# Patient Record
Sex: MALE | Race: OTHER | Employment: FULL TIME | URBAN - METROPOLITAN AREA
[De-identification: names, ages, dates, MRNs, and addresses within clinical notes are randomized per-mention and may not be internally consistent; named-entity substitution may affect disease eponyms.]

---

## 2021-09-21 ENCOUNTER — OFFICE VISIT (OUTPATIENT)
Dept: FAMILY MEDICINE CLINIC | Age: 56
End: 2021-09-21
Payer: COMMERCIAL

## 2021-09-21 VITALS
DIASTOLIC BLOOD PRESSURE: 90 MMHG | HEART RATE: 94 BPM | BODY MASS INDEX: 36.22 KG/M2 | OXYGEN SATURATION: 94 % | SYSTOLIC BLOOD PRESSURE: 130 MMHG | WEIGHT: 282.2 LBS | HEIGHT: 74 IN | RESPIRATION RATE: 18 BRPM

## 2021-09-21 DIAGNOSIS — J45.909 ASTHMA, UNSPECIFIED ASTHMA SEVERITY, UNSPECIFIED WHETHER COMPLICATED, UNSPECIFIED WHETHER PERSISTENT: ICD-10-CM

## 2021-09-21 DIAGNOSIS — I95.1 ORTHOSTATIC HYPOTENSION: ICD-10-CM

## 2021-09-21 DIAGNOSIS — R06.02 SOB (SHORTNESS OF BREATH): Primary | ICD-10-CM

## 2021-09-21 DIAGNOSIS — J93.83 OTHER PNEUMOTHORAX: ICD-10-CM

## 2021-09-21 DIAGNOSIS — R68.89 EXERCISE INTOLERANCE: ICD-10-CM

## 2021-09-21 DIAGNOSIS — R07.89 CHEST TIGHTNESS: ICD-10-CM

## 2021-09-21 DIAGNOSIS — Z99.89 CPAP (CONTINUOUS POSITIVE AIRWAY PRESSURE) DEPENDENCE: ICD-10-CM

## 2021-09-21 DIAGNOSIS — D72.829 LEUKOCYTOSIS, UNSPECIFIED TYPE: ICD-10-CM

## 2021-09-21 DIAGNOSIS — D50.9 IRON DEFICIENCY ANEMIA, UNSPECIFIED IRON DEFICIENCY ANEMIA TYPE: Primary | ICD-10-CM

## 2021-09-21 DIAGNOSIS — Z00.00 LABORATORY TESTS ORDERED AS PART OF A COMPLETE PHYSICAL EXAM (CPE): ICD-10-CM

## 2021-09-21 PROCEDURE — 93000 ELECTROCARDIOGRAM COMPLETE: CPT | Performed by: INTERNAL MEDICINE

## 2021-09-21 PROCEDURE — 99204 OFFICE O/P NEW MOD 45 MIN: CPT | Performed by: INTERNAL MEDICINE

## 2021-09-21 RX ORDER — ALBUTEROL SULFATE 90 UG/1
AEROSOL, METERED RESPIRATORY (INHALATION)
COMMUNITY
Start: 2021-08-10 | End: 2022-08-10

## 2021-09-21 RX ORDER — MIDODRINE HYDROCHLORIDE 5 MG/1
TABLET ORAL
COMMUNITY
Start: 2021-08-16 | End: 2021-09-24

## 2021-09-21 RX ORDER — CETIRIZINE HYDROCHLORIDE 10 MG/1
10 TABLET ORAL DAILY
COMMUNITY
Start: 2021-05-18 | End: 2022-01-25 | Stop reason: SDUPTHER

## 2021-09-21 RX ORDER — BUDESONIDE AND FORMOTEROL FUMARATE DIHYDRATE 80; 4.5 UG/1; UG/1
2 AEROSOL RESPIRATORY (INHALATION) 2 TIMES DAILY
COMMUNITY
Start: 2021-05-04 | End: 2022-01-25 | Stop reason: SDUPTHER

## 2021-09-21 ASSESSMENT — PATIENT HEALTH QUESTIONNAIRE - PHQ9
SUM OF ALL RESPONSES TO PHQ QUESTIONS 1-9: 0
SUM OF ALL RESPONSES TO PHQ9 QUESTIONS 1 & 2: 0
SUM OF ALL RESPONSES TO PHQ QUESTIONS 1-9: 0
SUM OF ALL RESPONSES TO PHQ QUESTIONS 1-9: 0
1. LITTLE INTEREST OR PLEASURE IN DOING THINGS: 0
2. FEELING DOWN, DEPRESSED OR HOPELESS: 0

## 2021-09-21 ASSESSMENT — ENCOUNTER SYMPTOMS
EYE PAIN: 0
WHEEZING: 1
VOMITING: 0
COLOR CHANGE: 0
CONSTIPATION: 0
NAUSEA: 0
DIARRHEA: 0
SHORTNESS OF BREATH: 1

## 2021-09-21 NOTE — PROGRESS NOTES
2021    Chief Complaint   Patient presents with    New Patient     Current PCP is retiring trying to establish new PCP. HPI    [facility]. Has asthma issues   Dx about   6-7 yrs ago with asthma  Never real bad and put on symbicort now  Not helping that much. Took pft about a yr ago  Tells me he was fine  Sat   92-94 %   A neighbor fell the other day and he almost had  His wife call for an ambulance. Used to lift wts up until few months ago  While exercising not that bad  If he lies down shortness of breath is worse  Going on 3 months  Does wheeze  Quit smoking   6 yr ago  Used to lift wts   4 x a week for 45 min  Can't do that anymore  Started all the sudden    Occasionally take clonazepam   Things are better with the wellbutrin  Was taking care of dad who had dementia   He passed in  2018    He is on midodrine for orthostatic hypotension  Too much he thinks   bp too high  Took one few days ago   Not taking regularly    Lives with wife  No children  Quit smoking      .25 pack for   30 yr  No alcohol    Brother  of leukemia  DM  Brother  Both parents with htn    Had to stop lifting wts somewhat aburpt due to shortness of breath  This summer probably in        Review of Systems   Constitutional: Positive for fatigue and unexpected weight change (gained wt). HENT: Negative for hearing loss and tinnitus. Eyes: Negative for pain and visual disturbance. Respiratory: Positive for shortness of breath and wheezing. Cardiovascular: Positive for leg swelling (around the left ankle). Negative for chest pain and palpitations. Gastrointestinal: Negative for constipation, diarrhea, nausea and vomiting. Endocrine: Positive for heat intolerance. Negative for cold intolerance. Genitourinary: Positive for frequency. Negative for dysuria. Musculoskeletal: Positive for joint swelling (right elbow). Negative for gait problem.    Skin: Negative for color change and rash.   Neurological: Positive for dizziness. Negative for headaches. Psychiatric/Behavioral: Positive for dysphoric mood. The patient is nervous/anxious. Health Maintenance   Topic Date Due    Hepatitis C screen  Never done    HIV screen  Never done    Lipid screen  Never done    Diabetes screen  Never done    Colon cancer screen colonoscopy  Never done    Shingles Vaccine (3 of 3) 2020    Flu vaccine (1) 2021    DTaP/Tdap/Td vaccine (2 - Td or Tdap) 2025    COVID-19 Vaccine  Completed    Hepatitis A vaccine  Aged Out    Hepatitis B vaccine  Aged Out    Hib vaccine  Aged Out    Meningococcal (ACWY) vaccine  Aged Out    Pneumococcal 0-64 years Vaccine  Aged Out      Social History     Tobacco Use    Smoking status: Former Smoker     Packs/day: 0.25     Years: 30.00     Pack years: 7.50     Types: Cigarettes     Start date: 3/11/1985     Quit date: 2015     Years since quittin.8    Smokeless tobacco: Never Used   Vaping Use    Vaping Use: Never used   Substance Use Topics    Alcohol use: No    Drug use: No      Family History   Problem Relation Age of Onset    Asthma Other     Heart Disease Other     High Blood Pressure Other      Prior to Visit Medications    Medication Sig Taking? Authorizing Provider   budesonide-formoterol (SYMBICORT) 80-4.5 MCG/ACT AERO Inhale 2 puffs into the lungs 2 times daily Yes Historical Provider, MD   albuterol sulfate  (90 Base) MCG/ACT inhaler INHALE 2 PUFFS BY MOUTH EVERY 6 HOURS AS NEEDED Yes Historical Provider, MD   cetirizine (ZYRTEC) 10 MG tablet Take 10 mg by mouth daily Yes Historical Provider, MD   acetaminophen (TYLENOL) 650 MG CR tablet Take 650 mg by mouth every 8 hours as needed for Pain Yes Historical Provider, MD   buPROPion (WELLBUTRIN) 75 MG tablet Take 75 mg by mouth 2 times daily Yes Historical Provider, MD   clonazePAM (KLONOPIN) 1 MG tablet Take 1 mg by mouth 3 times daily as needed.  . Yes Historical Provider, MD   omeprazole (PRILOSEC) 20 MG capsule Take 20 mg by mouth daily. Yes Historical Provider, MD   midodrine (PROAMATINE) 5 MG tablet TAKE 1 TABLET BY MOUTH 3 TIMES A DAY  Patient not taking: Reported on 9/21/2021  Historical Provider, MD   naproxen (NAPROSYN) 500 MG tablet Take 1 tablet by mouth 2 times daily for 20 doses  Marcus Lee PA-C   cyclobenzaprine (FLEXERIL) 10 MG tablet Take 1 tablet by mouth 3 times daily as needed for Muscle spasms  Patient not taking: Reported on 9/21/2021  Martín Hernandez MD   methylPREDNISolone (MEDROL, OKSANA,) 4 MG tablet Take by mouth.   Patient not taking: Reported on 9/21/2021  Martín Hernandez MD   CITALOPRAM HYDROBROMIDE PO Take 20 mg by mouth daily Pt. unsure of dose  Patient not taking: Reported on 9/21/2021  Historical Provider, MD   Omega-3 Fatty Acids (FISH OIL PO) Take by mouth daily   Patient not taking: Reported on 9/21/2021  Historical Provider, MD   dicyclomine (BENTYL) 10 MG capsule Take 1 capsule by mouth 4 times daily (before meals and nightly)  Perry Smith MD     Patient Active Problem List   Diagnosis    Diverticulitis of intestine with perforation    AVN (avascular necrosis of bone) ([facility] [address]    H/O total hip arthroplasty    Leukocytosis    Pneumoperitoneum    Diverticulitis of colon    Rotator cuff (capsule) sprain        LABS:     Lab Results   Component Value Date    LABMICR YES 05/26/2016    LABMICR YES 09/10/2015    LABMICR YES 07/17/2014       Lab Results   Component Value Date     (L) 11/24/2014     11/20/2014     (L) 11/19/2014    K 3.4 (L) 11/24/2014    K 4.9 11/20/2014    K 4.3 11/19/2014    CL 98 (L) 11/24/2014     11/20/2014    CL 98 (L) 11/19/2014    CO2 20 (L) 09/10/2015    CO2 24 11/24/2014    CO2 27 11/20/2014    BUN 8 11/24/2014    BUN 18 11/20/2014    BUN 15 11/19/2014    CREATININE 1.1 09/10/2015    CREATININE 0.8 (L) 11/24/2014    CREATININE 1.2 11/20/2014    GLUCOSE 128 (H) 11/24/2014    GLUCOSE 140 (H) 11/20/2014    GLUCOSE 96 11/19/2014    CALCIUM 8.9 11/24/2014    CALCIUM 9.0 11/20/2014    CALCIUM 9.9 11/19/2014       No results found for: CHOL, TRIG, HDL, LDLCALC, LDLDIRECT    Lab Results   Component Value Date    ALT 14 09/10/2015    ALT 17 07/25/2014    ALT 15 07/24/2014    AST 13 (L) 09/10/2015    AST 18 07/25/2014    AST 10 (L) 07/24/2014       No results found for: TSH, T4FREE    Lab Results   Component Value Date    WBC 14.7 (H) 09/10/2015    WBC 18.0 (H) 11/25/2014    WBC 17.0 (H) 11/24/2014    HGB 14.9 09/10/2015    HGB 10.5 (L) 11/25/2014    HGB 11.1 (L) 11/24/2014    HCT 45.8 09/10/2015    HCT 31.8 (L) 11/25/2014    HCT 33.5 (L) 11/24/2014    MCV 93.6 09/10/2015    MCV 90.2 11/25/2014    MCV 90.8 11/24/2014     09/10/2015     11/25/2014     11/24/2014       Lab Results   Component Value Date    INR 1.42 (H) 07/25/2014        No results found for: PSA     No results found for: LABURIC      No results found for: PSA, PSADIA     PHYSICAL EXAM:  BP (!) 130/90 (Site: Left Upper Arm, Position: Sitting, Cuff Size: Large Adult)   Pulse 94   Resp 18   Ht 6' 2\" (1.88 m)   Wt 282 lb 3.2 oz (128 kg)   SpO2 94%   BMI 36.23 kg/m²    Physical Exam  Constitutional:       Appearance: He is well-developed. He is obese. HENT:      Head: Normocephalic and atraumatic. Comments: Right ear with wax     Left Ear: Tympanic membrane and ear canal normal.   Eyes:      General: No scleral icterus. Conjunctiva/sclera: Conjunctivae normal.   Neck:      Thyroid: No thyromegaly. Vascular: No carotid bruit. Comments: No thyroid enlargement  Cardiovascular:      Rate and Rhythm: Normal rate and regular rhythm. Heart sounds: No murmur heard. Pulmonary:      Effort: Pulmonary effort is normal. No respiratory distress. Breath sounds: Normal breath sounds. No wheezing. Abdominal:      General: There is no distension.       Palpations: Abdomen is soft.      Tenderness: There is no abdominal tenderness. Musculoskeletal:         General: No deformity. Cervical back: Neck supple. Lymphadenopathy:      Cervical: No cervical adenopathy. Skin:     General: Skin is warm and dry. Findings: No rash. Neurological:      General: No focal deficit present. Mental Status: He is alert. Motor: No abnormal muscle tone. Psychiatric:         Mood and Affect: Mood normal.         Behavior: Behavior normal.         Thought Content: Thought content normal.         Judgment: Judgment normal.       BP Readings from Last 5 Encounters:   09/21/21 (!) 130/90   06/11/18 114/81   05/26/16 124/79   10/13/15 108/70   10/05/15 119/83       Wt Readings from Last 5 Encounters:   09/21/21 282 lb 3.2 oz (128 kg)   06/11/18 250 lb (113.4 kg)   05/26/16 230 lb (104.3 kg)   10/13/15 207 lb (93.9 kg)   10/02/15 215 lb (97.5 kg)    Ibeth Armas was seen today for new patient. Diagnoses and all orders for this visit:    SOB (shortness of breath)  -     EKG 12 Lead  -     XR CHEST STANDARD (2 VW); Future  -     TROPONIN; Future  -     [facility]  ekg nsr rate  92 IA and QTC are fine. No concerning t wave abn. Other pneumothorax  twice in his 19's ( was thin)    Laboratory tests ordered as part of a complete physical exam (CPE)  -     CBC Auto Differential; Future  -     Comprehensive Metabolic Panel; Future  -     Lipid Panel; Future  -     Hemoglobin A1C; Future  -     TSH with Reflex;  Future    Chest tightness  -     TROPONIN; Future  -     Delores Thornton MD, [facility]    Asthma, unspecified asthma severity, unspecified whether complicated, unspecified whether persistent  -     Delores Thornton MD, [facility]    Orthostatic hypotension  -     Delores Thornton MD, [facility], [facility]    Exercise intolerance  -     Delores Thornton MD, [facility]    CPAP (continuous positive airway pressure) dependence    Leukocytosis, unspecified type      This is a new patient to get established today. He tells me he has been more short of breath than usual.  This been going on about 3 months  He tells me he had been lifting weights for about 45 minutes 3 times a week. He just was not able to do that anymore . Wife almost called for a squad the other day when he helped a neighbor who had fallen  Need to rule out a cardiac cause. He thought it was related to asthma  No wheezing on exam  Also was taking midodrine but has stopped. bp was high in the office.   Follow up 3-4 week

## 2021-09-22 NOTE — PROGRESS NOTES
disease)      Past Surgical History:   Procedure Laterality Date    COLON SURGERY      COLOSTOMY      HIP SURGERY Bilateral L , R     Total Arthroplasty    LAPAROTOMY  14    EXPLORATORY LAPAROTOMY, BOWEL RESECTION, COLOSTOMY    LUNG SURGERY      OTHER SURGICAL HISTORY  14    OTHER SURGICAL HISTORY  10/5/15    Excision of sutures from abdominal wound    REVISION COLOSTOMY  14    Reversal     Family History   Problem Relation Age of Onset    Asthma Other     Heart Disease Other     High Blood Pressure Other      Social History     Tobacco Use    Smoking status: Former Smoker     Packs/day: 0.25     Years: 30.00     Pack years: 7.50     Types: Cigarettes     Start date: 3/11/1985     Quit date: 2015     Years since quittin.8    Smokeless tobacco: Never Used   Vaping Use    Vaping Use: Never used   Substance Use Topics    Alcohol use: No    Drug use: No       No Known Allergies  Current Outpatient Medications   Medication Sig Dispense Refill    budesonide-formoterol (SYMBICORT) 80-4.5 MCG/ACT AERO Inhale 2 puffs into the lungs 2 times daily      albuterol sulfate  (90 Base) MCG/ACT inhaler INHALE 2 PUFFS BY MOUTH EVERY 6 HOURS AS NEEDED      cetirizine (ZYRTEC) 10 MG tablet Take 10 mg by mouth daily      acetaminophen (TYLENOL) 650 MG CR tablet Take 650 mg by mouth every 8 hours as needed for Pain      buPROPion (WELLBUTRIN) 75 MG tablet Take 75 mg by mouth 2 times daily      omeprazole (PRILOSEC) 20 MG capsule Take 20 mg by mouth daily. No current facility-administered medications for this visit. Physical Exam:   /86   Pulse 91   Ht 6' 2\" (1.88 m)   Wt 283 lb 9.6 oz (128.6 kg)   SpO2 98%   BMI 36.41 kg/m²   No intake or output data in the 24 hours ending 21 0959  Wt Readings from Last 2 Encounters:   21 283 lb 9.6 oz (128.6 kg)   21 282 lb 3.2 oz (128 kg)     Constitutional: He is oriented to person, place, and time. was scribed in the presence of Jonathan Martinez MD by [employer], RN. Physician Attestation:  The scribes documentation has been prepared under my direction and personally reviewed by me in its entirety. I, Dr. Marcus Hauser personally performed the services described in this documentation as scribed by my RN in my presence, and I confirm that the note above accurately reflects all work, treatment, procedures, and medical decision making performed by me.

## 2021-09-23 DIAGNOSIS — Z86.2 HISTORY OF LEUKOCYTOSIS: ICD-10-CM

## 2021-09-23 DIAGNOSIS — D50.9 MICROCYTIC ANEMIA: Primary | ICD-10-CM

## 2021-09-23 DIAGNOSIS — Z80.6 FAMILY HISTORY OF LEUKEMIA: ICD-10-CM

## 2021-09-24 ENCOUNTER — HOSPITAL ENCOUNTER (OUTPATIENT)
Age: 56
Discharge: HOME | End: 2021-09-24
Payer: COMMERCIAL

## 2021-09-24 ENCOUNTER — OFFICE VISIT (OUTPATIENT)
Dept: CARDIOLOGY CLINIC | Age: 56
End: 2021-09-24
Payer: COMMERCIAL

## 2021-09-24 ENCOUNTER — HOSPITAL ENCOUNTER (OUTPATIENT)
Dept: GENERAL RADIOLOGY | Age: 56
Discharge: HOME | End: 2021-09-24
Payer: COMMERCIAL

## 2021-09-24 VITALS
DIASTOLIC BLOOD PRESSURE: 86 MMHG | OXYGEN SATURATION: 98 % | BODY MASS INDEX: 36.4 KG/M2 | HEART RATE: 91 BPM | SYSTOLIC BLOOD PRESSURE: 124 MMHG | WEIGHT: 283.6 LBS | HEIGHT: 74 IN

## 2021-09-24 DIAGNOSIS — I95.1 ORTHOSTATIC HYPOTENSION: ICD-10-CM

## 2021-09-24 DIAGNOSIS — G47.33 OSA (OBSTRUCTIVE SLEEP APNEA): ICD-10-CM

## 2021-09-24 DIAGNOSIS — D64.9 ANEMIA, UNSPECIFIED TYPE: ICD-10-CM

## 2021-09-24 DIAGNOSIS — R06.02 SOB (SHORTNESS OF BREATH): ICD-10-CM

## 2021-09-24 DIAGNOSIS — R06.02 SOB (SHORTNESS OF BREATH): Primary | ICD-10-CM

## 2021-09-24 PROCEDURE — 71046 X-RAY EXAM CHEST 2 VIEWS: CPT

## 2021-09-24 PROCEDURE — 99244 OFF/OP CNSLTJ NEW/EST MOD 40: CPT | Performed by: INTERNAL MEDICINE

## 2021-09-24 RX ORDER — FUROSEMIDE 20 MG/1
20 TABLET ORAL DAILY
Qty: 60 TABLET | Refills: 3 | Status: SHIPPED | OUTPATIENT
Start: 2021-09-24 | End: 2021-12-02 | Stop reason: SDUPTHER

## 2021-10-06 ENCOUNTER — OFFICE VISIT (OUTPATIENT)
Dept: FAMILY MEDICINE CLINIC | Age: 56
End: 2021-10-06
Payer: COMMERCIAL

## 2021-10-06 VITALS
BODY MASS INDEX: 36.19 KG/M2 | WEIGHT: 282 LBS | OXYGEN SATURATION: 96 % | SYSTOLIC BLOOD PRESSURE: 134 MMHG | HEIGHT: 74 IN | RESPIRATION RATE: 12 BRPM | DIASTOLIC BLOOD PRESSURE: 88 MMHG | HEART RATE: 112 BPM

## 2021-10-06 DIAGNOSIS — R73.03 PREDIABETES: ICD-10-CM

## 2021-10-06 DIAGNOSIS — Z86.2 HISTORY OF LEUKOCYTOSIS: ICD-10-CM

## 2021-10-06 DIAGNOSIS — D64.9 ANEMIA, UNSPECIFIED TYPE: Primary | ICD-10-CM

## 2021-10-06 DIAGNOSIS — Z80.6 FAMILY HISTORY OF LEUKEMIA: ICD-10-CM

## 2021-10-06 DIAGNOSIS — Z23 NEED FOR INFLUENZA VACCINATION: ICD-10-CM

## 2021-10-06 PROCEDURE — 99214 OFFICE O/P EST MOD 30 MIN: CPT | Performed by: INTERNAL MEDICINE

## 2021-10-06 PROCEDURE — 90688 IIV4 VACCINE SPLT 0.5 ML IM: CPT | Performed by: INTERNAL MEDICINE

## 2021-10-06 PROCEDURE — 90471 IMMUNIZATION ADMIN: CPT | Performed by: INTERNAL MEDICINE

## 2021-10-06 RX ORDER — FERROUS SULFATE 325(65) MG
325 TABLET ORAL
Status: ON HOLD | COMMUNITY
End: 2022-04-14

## 2021-10-06 ASSESSMENT — ENCOUNTER SYMPTOMS: SHORTNESS OF BREATH: 1

## 2021-10-06 NOTE — PROGRESS NOTES
Chari Jason (:  1965) is a 49 y.o. male,Established patient, here for evaluation of the following chief complaint(s):  Abnormal Lab (f/u)    Royal Marshall was seen today for abnormal lab. Diagnoses and all orders for this visit:    Anemia, unspecified type    Need for influenza vaccination  -     INFLUENZA, QUADV, 3 YRS AND OLDER, IM, MDV, 0.5ML (AFLURIA QUADV)    History of leukocytosis    Family history of leukemia brother    Prediabetes    work on diet  And exercise ( when ok with cardiology)  Take iron bid instead of once a day with vit c.  bp  Up but stress from sister's death   repeat     SUBJECTIVE/OBJECTIVE:  HPI  Pt saw cardiology Dr Nadiya De Santiago  for his shortness of breath  He stopped the midodrine on his own  Started  Him on lasix  Ordered an echo  Does not see blood in the stool or black tarry stool. Here to go over labs. Prediabetic  Eats a lot of carbs  Bread     Sister passed yesterday  Was  60 yo  Unknown why she   Appetite is down    Brother  of leukemia  Brother had heart dx  46      Review of Systems   Constitutional: Negative for appetite change and unexpected weight change. Respiratory: Positive for shortness of breath (with exertion). Genitourinary:        No black stool other than from iron  No bloody stools          Objective   Physical Exam  Constitutional:       Appearance: Normal appearance. He is obese. HENT:      Head: Normocephalic and atraumatic. Pulmonary:      Effort: Pulmonary effort is normal.   Neurological:      Mental Status: He is alert. Psychiatric:         Mood and Affect: Mood normal.         Behavior: Behavior normal.         Thought Content: Thought content normal.         Judgment: Judgment normal.            An electronic signature was used to authenticate this note.     --Varsha Berman MD

## 2021-10-12 DIAGNOSIS — E61.1 IRON DEFICIENCY: Primary | ICD-10-CM

## 2021-10-21 ENCOUNTER — PATIENT MESSAGE (OUTPATIENT)
Dept: FAMILY MEDICINE CLINIC | Age: 56
End: 2021-10-21

## 2021-10-21 NOTE — TELEPHONE ENCOUNTER
From: Sinan Joiner  To: Deniz Hutchinson MD  Sent: 10/21/2021 12:56 PM EDT  Subject: Test Results Question    Got a JAK2 MUTATION result of 0.0. I have no idea what that means. Can someone explain it to me please?   Thank you

## 2021-12-02 NOTE — TELEPHONE ENCOUNTER
Medication Refill    Medication needing refilled:  furosemide (LASIX)      Dosage of the medication:  20 MG tablet       How are you taking this medication (QD, BID, TID, QID, PRN):  Take 1 tablet by mouth daily    30 or 90 day supply called in:90      Which Pharmacy are we sending the medication to?:    [address]  [place], [address]  [facility]: [phone]

## 2021-12-03 RX ORDER — FUROSEMIDE 20 MG/1
20 TABLET ORAL DAILY
Qty: 90 TABLET | Refills: 3 | Status: SHIPPED | OUTPATIENT
Start: 2021-12-03 | End: 2022-05-04 | Stop reason: SDUPTHER

## 2021-12-23 ENCOUNTER — TELEPHONE (OUTPATIENT)
Dept: FAMILY MEDICINE CLINIC | Age: 56
End: 2021-12-23

## 2021-12-23 ENCOUNTER — PATIENT MESSAGE (OUTPATIENT)
Dept: FAMILY MEDICINE CLINIC | Age: 56
End: 2021-12-23

## 2021-12-23 DIAGNOSIS — R68.89 FLU-LIKE SYMPTOMS: Primary | ICD-10-CM

## 2021-12-23 NOTE — TELEPHONE ENCOUNTER
From: [facility]  To: Dr. Claudy Hunter: 12/23/2021 10:38 AM EST  Subject: Non-Urgent Medical Question    Hello,  I am having flu like symptoms. Sneezing, cough, achy.  I was wondering if y'all could tell me where to go for a COVID test.  Thank you

## 2022-01-14 ENCOUNTER — HOSPITAL ENCOUNTER (OUTPATIENT)
Dept: NON INVASIVE DIAGNOSTICS | Age: 57
Discharge: HOME | End: 2022-01-14
Payer: COMMERCIAL

## 2022-01-14 DIAGNOSIS — R06.02 SOB (SHORTNESS OF BREATH): ICD-10-CM

## 2022-01-14 LAB
LV EF: 58 %
LVEF MODALITY: NORMAL

## 2022-01-14 PROCEDURE — 93306 TTE W/DOPPLER COMPLETE: CPT

## 2022-01-21 RX ORDER — IBUPROFEN 200 MG
800 TABLET ORAL EVERY 6 HOURS PRN
COMMUNITY

## 2022-01-21 RX ORDER — BUPROPION HYDROCHLORIDE 200 MG/1
200 TABLET, EXTENDED RELEASE ORAL 2 TIMES DAILY
Status: ON HOLD | COMMUNITY
End: 2022-04-14

## 2022-01-21 RX ORDER — ASCORBIC ACID 1000 MG
TABLET ORAL
COMMUNITY
End: 2022-01-21

## 2022-01-21 RX ORDER — ASPIRIN 81 MG/1
81 TABLET ORAL DAILY
COMMUNITY

## 2022-01-21 RX ORDER — BUPROPION HYDROCHLORIDE 200 MG/1
TABLET, EXTENDED RELEASE ORAL
COMMUNITY
Start: 2021-10-27 | End: 2022-01-21

## 2022-01-21 NOTE — FLOWSHEET NOTE
Preoperative Screening for Elective Surgery/Invasive Procedures While COVID-19 present in the community     Have you tested positive or have been told to self-isolate for COVID-19 like symptoms within the past 28 days?  Do you currently have any of the following symptoms? o Fever >100.0 F or 99.9 F in immunocompromised patients? o New onset cough, shortness of breath or difficulty breathing?  o New onset sore throat, myalgia (muscle aches and pains), headache, loss of taste/smell or diarrhea?  Have you had a potential exposure to COVID-19 within the past 14 days by:  o Close contact with a confirmed case? o Close contact with a healthcare worker,  or essential infrastructure worker (grocery store, [employer], gas station, public utilities or transportation)? o Do you reside in a congregate setting such as; skilled nursing facility, adult home, correctional facility, homeless shelter or other institutional setting?  o Have you had recent travel to a known COVID-19 hotspot? Indicate if the patient has a positive screen by answering yes to one or more of the above questions. Patients who test positive or screen positive prior to surgery or on the day of surgery should be evaluated in conjunction with the surgeon/proceduralist/anesthesiologist to determine the urgency of the procedure. no to all above.  Vaccinated, proof in [facility]
of surgery. All jewelry must be removed. If you have dentures, they will be removed before going to operating room. For your convenience, we will provide you with a container. If you wear contact lenses or glasses, they will be removed, please bring a case for them. If you have a living will and a durable power of  for healthcare, please bring in a copy. As part of our patient safety program to minimize surgical site infections, we ask you to do the following:    · Please notify your surgeon if you develop any illness between         now and the  day of your surgery. · This includes a cough, cold, fever, sore throat, nausea,         or vomiting, and diarrhea, etc.  ·  Please notify your surgeon if you experience dizziness, shortness         of breath or blurred vision between now and the time of your surgery. Do not shave your operative site 96 hours prior to surgery. For face and neck surgery, men may use an electric razor 48 hours   prior to surgery. You may shower the night before surgery or the morning of   your surgery with an antibacterial soap. You will need to bring a photo ID and insurance card    [facility] has an onsite pharmacy, would you like to utilize our pharmacy     If you will be staying overnight and use a C-pap machine, please bring   your C-pap to hospital     Our goal is to provide you with excellent care, therefore, visitors will be limited to two(2) in the room at a time so that we may focus on providing this care for you. Please contact pre-admission testing if you have any further questions. [facility] phone number:  [phone]  Please note these are generalized instructions for all surgical cases, you may be provided with more specific instructions according to your surgery.

## 2022-01-27 ENCOUNTER — ANESTHESIA EVENT (OUTPATIENT)
Dept: ENDOSCOPY | Age: 57
End: 2022-01-27
Payer: COMMERCIAL

## 2022-01-28 ENCOUNTER — HOSPITAL ENCOUNTER (OUTPATIENT)
Age: 57
Setting detail: OUTPATIENT SURGERY
Discharge: HOME | End: 2022-01-28
Attending: INTERNAL MEDICINE | Admitting: INTERNAL MEDICINE
Payer: COMMERCIAL

## 2022-01-28 ENCOUNTER — ANESTHESIA (OUTPATIENT)
Dept: ENDOSCOPY | Age: 57
End: 2022-01-28
Payer: COMMERCIAL

## 2022-01-28 VITALS
BODY MASS INDEX: 36.19 KG/M2 | HEART RATE: 96 BPM | HEIGHT: 74 IN | OXYGEN SATURATION: 93 % | SYSTOLIC BLOOD PRESSURE: 133 MMHG | WEIGHT: 282 LBS | RESPIRATION RATE: 17 BRPM | TEMPERATURE: 97.2 F | DIASTOLIC BLOOD PRESSURE: 99 MMHG

## 2022-01-28 VITALS — SYSTOLIC BLOOD PRESSURE: 114 MMHG | OXYGEN SATURATION: 93 % | DIASTOLIC BLOOD PRESSURE: 74 MMHG

## 2022-01-28 DIAGNOSIS — D50.0 CHRONIC BLOOD LOSS ANEMIA: ICD-10-CM

## 2022-01-28 PROCEDURE — 3700000001 HC ADD 15 MINUTES (ANESTHESIA): Performed by: INTERNAL MEDICINE

## 2022-01-28 PROCEDURE — 7100000010 HC PHASE II RECOVERY - FIRST 15 MIN: Performed by: INTERNAL MEDICINE

## 2022-01-28 PROCEDURE — 3609010600 HC COLONOSCOPY POLYPECTOMY SNARE/COLD BIOPSY: Performed by: INTERNAL MEDICINE

## 2022-01-28 PROCEDURE — 88305 TISSUE EXAM BY PATHOLOGIST: CPT

## 2022-01-28 PROCEDURE — 2580000003 HC RX 258: Performed by: ANESTHESIOLOGY

## 2022-01-28 PROCEDURE — 7100000011 HC PHASE II RECOVERY - ADDTL 15 MIN: Performed by: INTERNAL MEDICINE

## 2022-01-28 PROCEDURE — 3700000000 HC ANESTHESIA ATTENDED CARE: Performed by: INTERNAL MEDICINE

## 2022-01-28 PROCEDURE — 6360000002 HC RX W HCPCS: Performed by: NURSE ANESTHETIST, CERTIFIED REGISTERED

## 2022-01-28 PROCEDURE — 3609012400 HC EGD TRANSORAL BIOPSY SINGLE/MULTIPLE: Performed by: INTERNAL MEDICINE

## 2022-01-28 PROCEDURE — 2709999900 HC NON-CHARGEABLE SUPPLY: Performed by: INTERNAL MEDICINE

## 2022-01-28 PROCEDURE — 88342 IMHCHEM/IMCYTCHM 1ST ANTB: CPT

## 2022-01-28 PROCEDURE — 2500000003 HC RX 250 WO HCPCS: Performed by: NURSE ANESTHETIST, CERTIFIED REGISTERED

## 2022-01-28 RX ORDER — PROPOFOL 10 MG/ML
INJECTION, EMULSION INTRAVENOUS PRN
Status: DISCONTINUED | OUTPATIENT
Start: 2022-01-28 | End: 2022-01-28 | Stop reason: SDUPTHER

## 2022-01-28 RX ORDER — SODIUM CHLORIDE 0.9 % (FLUSH) 0.9 %
5-40 SYRINGE (ML) INJECTION PRN
Status: DISCONTINUED | OUTPATIENT
Start: 2022-01-28 | End: 2022-01-28 | Stop reason: HOSPADM

## 2022-01-28 RX ORDER — SODIUM CHLORIDE 0.9 % (FLUSH) 0.9 %
5-40 SYRINGE (ML) INJECTION EVERY 12 HOURS SCHEDULED
Status: DISCONTINUED | OUTPATIENT
Start: 2022-01-28 | End: 2022-01-28 | Stop reason: HOSPADM

## 2022-01-28 RX ORDER — LABETALOL HYDROCHLORIDE 5 MG/ML
5 INJECTION, SOLUTION INTRAVENOUS EVERY 10 MIN PRN
Status: DISCONTINUED | OUTPATIENT
Start: 2022-01-28 | End: 2022-01-28 | Stop reason: HOSPADM

## 2022-01-28 RX ORDER — ONDANSETRON 2 MG/ML
4 INJECTION INTRAMUSCULAR; INTRAVENOUS
Status: DISCONTINUED | OUTPATIENT
Start: 2022-01-28 | End: 2022-01-28 | Stop reason: HOSPADM

## 2022-01-28 RX ORDER — SODIUM CHLORIDE 9 MG/ML
INJECTION, SOLUTION INTRAVENOUS CONTINUOUS
Status: DISCONTINUED | OUTPATIENT
Start: 2022-01-28 | End: 2022-01-28 | Stop reason: HOSPADM

## 2022-01-28 RX ORDER — SODIUM CHLORIDE 9 MG/ML
25 INJECTION, SOLUTION INTRAVENOUS PRN
Status: DISCONTINUED | OUTPATIENT
Start: 2022-01-28 | End: 2022-01-28 | Stop reason: HOSPADM

## 2022-01-28 RX ORDER — LIDOCAINE HYDROCHLORIDE 20 MG/ML
INJECTION, SOLUTION EPIDURAL; INFILTRATION; INTRACAUDAL; PERINEURAL PRN
Status: DISCONTINUED | OUTPATIENT
Start: 2022-01-28 | End: 2022-01-28 | Stop reason: SDUPTHER

## 2022-01-28 RX ORDER — PROMETHAZINE HYDROCHLORIDE 25 MG/ML
6.25 INJECTION, SOLUTION INTRAMUSCULAR; INTRAVENOUS
Status: DISCONTINUED | OUTPATIENT
Start: 2022-01-28 | End: 2022-01-28 | Stop reason: HOSPADM

## 2022-01-28 RX ADMIN — SODIUM CHLORIDE: 9 INJECTION, SOLUTION INTRAVENOUS at 08:21

## 2022-01-28 RX ADMIN — SODIUM CHLORIDE: 9 INJECTION, SOLUTION INTRAVENOUS at 08:56

## 2022-01-28 RX ADMIN — PROPOFOL 50 MG: 10 INJECTION, EMULSION INTRAVENOUS at 09:02

## 2022-01-28 RX ADMIN — PROPOFOL 50 MG: 10 INJECTION, EMULSION INTRAVENOUS at 08:59

## 2022-01-28 RX ADMIN — LIDOCAINE HYDROCHLORIDE 50 MG: 20 INJECTION, SOLUTION EPIDURAL; INFILTRATION; INTRACAUDAL; PERINEURAL at 08:56

## 2022-01-28 RX ADMIN — PROPOFOL 50 MG: 10 INJECTION, EMULSION INTRAVENOUS at 09:08

## 2022-01-28 RX ADMIN — PROPOFOL 150 MG: 10 INJECTION, EMULSION INTRAVENOUS at 08:56

## 2022-01-28 RX ADMIN — PROPOFOL 50 MG: 10 INJECTION, EMULSION INTRAVENOUS at 08:58

## 2022-01-28 RX ADMIN — PROPOFOL 50 MG: 10 INJECTION, EMULSION INTRAVENOUS at 09:16

## 2022-01-28 ASSESSMENT — PAIN SCALES - GENERAL
PAINLEVEL_OUTOF10: 0
PAINLEVEL_OUTOF10: 0

## 2022-01-28 ASSESSMENT — PAIN - FUNCTIONAL ASSESSMENT
PAIN_FUNCTIONAL_ASSESSMENT: 0-10
PAIN_FUNCTIONAL_ASSESSMENT: 0-10

## 2022-01-28 NOTE — H&P
[facility]   Pre-operative History and Physical    Patient: Sonny Agudelo  : 1965  Acct#: [de-identified]    History Obtained From: electronic medical record    HISTORY OF PRESENT ILLNESS  Procedure:EGD and colonoscopy  Indications:iron deficient anemia  Past Medical History:        Diagnosis Date    Anxiety     Anxiety 2005    Asthma 2019    exercise-induced asthma    Bursitis 2016    both shoulders    Depression     Depression 2019    Diverticula of colon     Diverticulitis 2019    of colon    GERD (gastroesophageal reflux disease)     Orthostatic hypotension 2021    Reflux esophagitis 2005    Sleep apnea     cpap     Past Surgical History:        Procedure Laterality Date    COLON SURGERY      COLONOSCOPY      COLOSTOMY      COLOSTOMY      closed    HIP SURGERY Bilateral L 2006, R 2007    Total Arthroplasty    JOINT REPLACEMENT  2019    hx bilateral hip replacements    LAPAROTOMY  14    EXPLORATORY LAPAROTOMY, BOWEL RESECTION, COLOSTOMY    LUNG SURGERY      OTHER SURGICAL HISTORY  14    OTHER SURGICAL HISTORY  10/5/15    Excision of sutures from abdominal wound    REVISION COLOSTOMY  14    Reversal    ROTATOR CUFF REPAIR  2019    THORACENTESIS      insert chest tube-age 25     Medications prior to admission:   Prior to Admission medications    Medication Sig Start Date End Date Taking?  Authorizing Provider   budesonide-formoterol (SYMBICORT) 80-4.5 MCG/ACT AERO Inhale 2 puffs into the lungs 2 times daily 22  Yes Jaqui Valdez MD   cetirizine (ZYRTEC) 10 MG tablet Take 1 tablet by mouth daily as needed for Allergies 22  Yes Jaqui Valdez MD   [facility] SR) 200 MG extended release tablet Take 200 mg by mouth 2 times daily   Yes Historical Provider, MD   aspirin 81 MG EC tablet Take 81 mg by mouth daily   Yes Historical Provider, Amada Fuller, (BL SAW PALMETTO PO) Take by mouth   Yes Historical Provider, MD   furosemide (LASIX) 20 MG tablet Take 1 tablet by mouth daily 12/3/21  Yes Leon Rees MD   ferrous sulfate (IRON 325) 325 (65 Fe) MG tablet Take 325 mg by mouth daily (with breakfast)   Yes Historical Provider, MD   omeprazole (PRILOSEC) 20 MG capsule Take 20 mg by mouth daily. Yes Historical Provider, MD   ibuprofen (ADVIL;MOTRIN) 200 MG tablet Take 800 mg by mouth every 6 hours as needed     Historical Provider, MD   albuterol sulfate  (90 Base) MCG/ACT inhaler INHALE 2 PUFFS BY MOUTH EVERY 6 HOURS AS NEEDED 8/10/21   Historical Provider, MD   acetaminophen (TYLENOL) 650 MG CR tablet Take 650 mg by mouth every 8 hours as needed for Pain    Historical Provider, MD     Allergies:   Patient has no known allergies. Social History     Socioeconomic History    Marital status:      Spouse name: Not on file    Number of children: Not on file    Years of education: Not on file    Highest education level: Not on file   Occupational History    Occupation: NA   Tobacco Use    Smoking status: Former Smoker     Packs/day: 0.25     Years: 30.00     Pack years: 7.50     Types: Cigarettes     Start date: 3/11/1985     Quit date: 2015     Years since quittin.1    Smokeless tobacco: Never Used   Vaping Use    Vaping Use: Never used   Substance and Sexual Activity    Alcohol use: No    Drug use: No    Sexual activity: Yes     Partners: Female   Other Topics Concern    Not on file   Social History Narrative    Not on file     Social Determinants of Health     Financial Resource Strain:     Difficulty of Paying Living Expenses: Not on file   Food Insecurity:     Worried About [address] in the Last Year: Not on file    Miya of Food in the Last Year: Not on file   Transportation Needs:     Lack of Transportation (Medical): Not on file    Lack of Transportation (Non-Medical):  Not on file   Physical Activity:     Days of Exercise per Week: Not on file  Minutes of Exercise per Session: Not on file   Stress:     Feeling of Stress : Not on file   Social Connections:     Frequency of Communication with Friends and Family: Not on file    Frequency of Social Gatherings with Friends and Family: Not on file    Attends Mandaen Services: Not on file    Active Member of [address] [place] [address] or Organizations: Not on file    Attends Club or Organization Meetings: Not on file    Marital Status: Not on file   Intimate Partner Violence:     Fear of Current or Ex-Partner: Not on file    Emotionally Abused: Not on file    Physically Abused: Not on file    Sexually Abused: Not on file   Housing Stability:     Unable to Pay for Housing in the Last Year: Not on file    Number of [place] in the Last Year: Not on file    Unstable Housing in the Last Year: Not on file     Family History   Problem Relation Age of Onset    Other Father         CHF    Cancer Father         prostate    Asthma Other     Heart Disease Other     High Blood Pressure Other     Cancer Brother         leukemia    Heart Attack Brother          PHYSICAL EXAM:      BP (!) 157/102   Pulse 94   Temp 97.5 °F (36.4 °C) (Temporal)   Resp 17   Ht 6' 2\" (1.88 m)   Wt 282 lb (127.9 kg)   SpO2 96%   BMI 36.21 kg/m²  I        Heart:normal    Lungs: normal    Abdomen: normal      ASA Grade:  See anesthesia note      ASSESSMENT AND PLAN:    1. Procedure options, risks and benefits reviewed with patient and expresses understanding.

## 2022-01-28 NOTE — ANESTHESIA POSTPROCEDURE EVALUATION
[facility] Department of Anesthesiology  Post-Anesthesia Note       Name:  Rylie Pitts                                  Age:  49 y.o. MRN:  [id number]     Last Vitals & Oxygen Saturation: BP (!) 133/99   Pulse 96   Temp 97.2 °F (36.2 °C) (Temporal)   Resp 17   Ht 6' 2\" (1.88 m)   Wt 282 lb (127.9 kg)   SpO2 93%   BMI 36.21 kg/m²   Patient Vitals for the past 4 hrs:   BP Temp Temp src Pulse Resp SpO2 Height Weight   01/28/22 0934 (!) 133/99 97.2 °F (36.2 °C) Temporal 96 17 93 %     01/28/22 0928 123/80 97.3 °F (36.3 °C) Temporal 98 16 96 %     01/28/22 0817 (!) 157/102 97.5 °F (36.4 °C) Temporal 94 17 96 % 6' 2\" (1.88 m) 282 lb (127.9 kg)   01/28/22 0813       6' 2\" (1.88 m) 282 lb (127.9 kg)       Level of consciousness:  Awake, alert    Respiratory: Respirations easy, no distress. Stable. Cardiovascular: Hemodynamically stable. Hydration: Adequate. PONV: Adequately managed. Post-op pain: Adequately controlled. Post-op assessment: Tolerated anesthetic well without complication. Complications:  None.     Vicki Daniel MD  January 28, 2022   10:10 AM

## 2022-01-28 NOTE — PROCEDURES
[facility]  Endoscopy Note    Patient: Pacheco Ahumada  : 1965  Acct#: [de-identified]    Procedure: Esophagogastroduodenoscopy with biopsy    Date:  2022     Surgeon:  Yamileth Scott MD, MD    Referring Physician:  Janiya Kunz    Preoperative Diagnosis:  anemia    Postoperative Diagnosis:  Ulcerative esophagitis and gastritis    Anesthesia: see anesthesia note. Indications: This is a 49y.o. year old male who presents today with Unexplained iron deficiency anaemia. Description of Procedure:  Informed consent was obtained from the patient after explanation of indications, benefits and possible risks and complications of the procedure. The patient was then taken to the endoscopy suite, placed in the left lateral decubitus position and the above IV sedation was administrered. The Olympus videoendoscope was placed in the patient's mouth and under direct visualization passed into the esophagus. Visualization of the esophagus demonstrated ulcerative esophagitis. .     The scope was then advanced into the stomach. Visualization of the gastric body and antrum demonstrated gastritis. The antrum was biopsied. .  A retroflexed exam of the gastric cardia and fundus demonstrated normal..  The pylorus was patent and the scope was advanced into the duodenum. Visualization of the duodenal bulb demonstrated normal..  The second portion of the duodenum demonstrated normal..    The scope was then withdrawn back into the stomach, it was decompressed, and the scope was completely withdrawn. The patient tolerated the procedure well and was taken to the post anesthesia care unit in good condition. Estimated Blood loss:  none    Impression: Ulcerative esophagitis and gastritis. Recommendations:Await pathology. PPI.     Yamileth Scott MD, MD  [facility]

## 2022-01-28 NOTE — ANESTHESIA PRE PROCEDURE
[facility] Department of Anesthesiology  Pre-Anesthesia Evaluation/Consultation       Name:  Morteza Macario  : 1965  Age:  49 y. o.                                            MRN:  [id number]  Date: 2022           Surgeon: Surgeon(s):  Ingrid Pham MD    Procedure: Procedure(s):  COLONOSCOPY DIAGNOSTIC  EGD ESOPHAGOGASTRODUODENOSCOPY     No Known Allergies  Patient Active Problem List   Diagnosis    Diverticulitis of intestine with perforation    AVN (avascular necrosis of bone) ([address]    H/O total hip arthroplasty    Leukocytosis    Pneumoperitoneum from diverticulitis    Diverticulitis of colon    Rotator cuff (capsule) sprain    Other pneumothorax  twice in his 20's ( was thin)    CPAP (continuous positive airway pressure) dependence    Exercise intolerance    Orthostatic hypotension    Asthma    Chest tightness    SOB (shortness of breath)    Family history of leukemia brother    History of leukocytosis     Past Medical History:   Diagnosis Date    Anxiety     Anxiety 2005    Asthma 2019    exercise-induced asthma    Bursitis 2016    both shoulders    Depression     Depression 2019    Diverticula of colon     Diverticulitis 2019    of colon    GERD (gastroesophageal reflux disease)     Orthostatic hypotension 2021    Reflux esophagitis 2005    Sleep apnea     cpap     Past Surgical History:   Procedure Laterality Date    COLON SURGERY      COLONOSCOPY      COLOSTOMY      COLOSTOMY  2014    closed    HIP SURGERY Bilateral L 2006, R 2007    Total Arthroplasty    JOINT REPLACEMENT  2019    hx bilateral hip replacements    LAPAROTOMY  14    EXPLORATORY LAPAROTOMY, BOWEL RESECTION, COLOSTOMY    LUNG SURGERY      OTHER SURGICAL HISTORY  14    OTHER SURGICAL HISTORY  10/5/15    Excision of sutures from abdominal wound    REVISION COLOSTOMY  14    Reversal    ROTATOR CUFF REPAIR  2019    THORACENTESIS      insert chest tube-age 25 Social History     Tobacco Use    Smoking status: Former Smoker     Packs/day: 0.25     Years: 30.00     Pack years: 7.50     Types: Cigarettes     Start date: 3/11/1985     Quit date: 2015     Years since quittin.1    Smokeless tobacco: Never Used   Vaping Use    Vaping Use: Never used   Substance Use Topics    Alcohol use: No    Drug use: No     Medications  No current facility-administered medications on file prior to encounter. Current Outpatient Medications on File Prior to Encounter   Medication Sig Dispense Refill    buPROPion (WELLBUTRIN SR) 200 MG extended release tablet Take 200 mg by mouth 2 times daily      aspirin 81 MG EC tablet Take 81 mg by mouth daily      Saw Palmetto, Serenoa repens, (BL SAW PALMETTO PO) Take by mouth      furosemide (LASIX) 20 MG tablet Take 1 tablet by mouth daily 90 tablet 3    ferrous sulfate (IRON 325) 325 (65 Fe) MG tablet Take 325 mg by mouth daily (with breakfast)      omeprazole (PRILOSEC) 20 MG capsule Take 20 mg by mouth daily.       ibuprofen (ADVIL;MOTRIN) 200 MG tablet Take 800 mg by mouth every 6 hours as needed       albuterol sulfate  (90 Base) MCG/ACT inhaler INHALE 2 PUFFS BY MOUTH EVERY 6 HOURS AS NEEDED      acetaminophen (TYLENOL) 650 MG CR tablet Take 650 mg by mouth every 8 hours as needed for Pain       Current Facility-Administered Medications   Medication Dose Route Frequency Provider Last Rate Last Admin    0.9 % sodium chloride infusion   IntraVENous Continuous Arya Ferro MD 75 mL/hr at 22 0821 New Bag at 22 0821    sodium chloride flush 0.9 % injection 5-40 mL  5-40 mL IntraVENous 2 times per day Arya Ferro MD        sodium chloride flush 0.9 % injection 5-40 mL  5-40 mL IntraVENous PRN Arya Ferro MD        0.9 % sodium chloride infusion  25 mL IntraVENous PRN Arya Ferro MD         Vital Signs (Current)   Vitals:    22 0817   BP: (!) 157/102   Pulse: 94 Resp: 17   Temp: 97.5 °F (36.4 °C)   SpO2: 96%     Vital Signs Statistics (for past 48 hrs)     Temp  Av.5 °F (36.4 °C)  Min: 97.5 °F (36.4 °C)   Min taken time: 22  Max: 97.5 °F (36.4 °C)   Max taken time: 22  Pulse  Av  Min: 94   Min taken time: 22  Max: 94   Max taken time: 22  Resp  Av  Min: 16   Min taken time: 22  Max: 17   Max taken time: 22  BP  Min: 157/102   Min taken time: 22  Max: 157/102   Max taken time: 22  SpO2  Av %  Min: 96 %   Min taken time: 22  Max: 96 %   Max taken time: 22    BP Readings from Last 3 Encounters:   22 (!) 157/102   10/06/21 134/88   21 124/86     BMI  Body mass index is 36.21 kg/m². Estimated body mass index is 36.21 kg/m² as calculated from the following:    Height as of this encounter: 6' 2\" (1.88 m). Weight as of this encounter: 282 lb (127.9 kg). CBC   Lab Results   Component Value Date    WBC 10.8 2021    RBC 4.87 2021    HGB 11.8 2021    HCT 37.0 2021    MCV 76.0 2021    RDW 19.1 2021     2021     CMP    Lab Results   Component Value Date     2021    K 4.8 2021     2021    CO2 24 2021    BUN 13 2021    CREATININE 1.1 2021    GFRAA >60 2021    AGRATIO 1.6 2021    LABGLOM >60 2021    GLUCOSE 72 2021    PROT 7.4 2021    CALCIUM 9.6 2021    BILITOT 0.3 2021    ALKPHOS 123 2021    AST 20 2021    ALT 23 2021     BMP    Lab Results   Component Value Date     2021    K 4.8 2021     2021    CO2 24 2021    BUN 13 2021    CREATININE 1.1 2021    CALCIUM 9.6 2021    GFRAA >60 2021    LABGLOM >60 2021    GLUCOSE 72 2021     POCGlucose  No results for input(s): GLUCOSE in the last 72 hours.    Coags    Lab Results Component Value Date    PROTIME 15.7 07/25/2014    INR 1.42 93/44/6700     HCG (If Applicable) No results found for: PREGTESTUR, PREGSERUM, HCG, HCGQUANT   ABGs No results found for: PHART, PO2ART, MQP5LNT, GCD8YLY, BEART, O8WJJODY   Type & Screen (If Applicable)  No results found for: LABABO, LABRH                         BMI: Wt Readings from Last 3 Encounters:       NPO Status:   Date of last liquid consumption: 01/28/22   Time of last liquid consumption: 0500 (dr Alejo Bang aware)   Date of last solid food consumption: 01/27/22      Time of last solid consumption: 1900       Anesthesia Evaluation  Patient summary reviewed history of anesthetic complications:   Airway: Mallampati: III  TM distance: >3 FB   Neck ROM: full   Dental: normal exam         Pulmonary:normal exam    (+) sleep apnea:  asthma:                            Cardiovascular:  Exercise tolerance: good (>4 METS),         ECG reviewed  Rhythm: regular  Rate: normal  Echocardiogram reviewed         Beta Blocker:  Not on Beta Blocker      ROS comment: EF 55     Neuro/Psych:   (+) psychiatric history:depression/anxiety             GI/Hepatic/Renal:   (+) GERD: well controlled, bowel prep,           Endo/Other: Negative Endo/Other ROS                    Abdominal:   (+) obese,           Vascular: negative vascular ROS. Other Findings:             Anesthesia Plan      MAC     ASA 3       Induction: intravenous. Anesthetic plan and risks discussed with patient. Plan discussed with CRNA. This pre-anesthesia assessment may be used as a history and physical.    DOS STAFF ADDENDUM:    Pt seen and examined, chart reviewed (including anesthesia, drug and allergy history). No interval changes to history and physical examination. Anesthetic plan, risks, benefits, alternatives, and personnel involved discussed with patient. Questions and concerns addressed. Patient(family) verbalized an understanding and agrees to proceed. Con Pirde MD  January 28, 2022  8:41 AM

## 2022-01-28 NOTE — ADDENDUM NOTE
Addendum  created 01/28/22 1508 by GUIDO Tomlin CRNA    Flowsheet accepted, Intraprocedure Flowsheets edited, Intraprocedure Meds edited

## 2022-01-28 NOTE — PROCEDURES
[facility]  Endoscopy Note    Patient: Kaylan Vergara  : 1965  Acct#: [de-identified]    Procedure: Colonoscopy with polypectomy (snare cautery)    Date:  2022    Surgeon:  Jairo Contreras MD, MD    Referring Physician:  Latha Roberts    Previous Colonoscopy: Yes  Date: unknown  Greater than 3 years? Yes    Preoperative Diagnosis:  anemia    Postoperative Diagnosis:  Colon polyp    Anesthesia:  See anesthesia note    Indications: This is a 49y.o. year old male who presents today with iron deficiency anemia. Procedure: An informed consent was obtained from the patient after explanation of indications, benefits, possible risks and complications of the procedure. The patient was then taken to the endoscopy suite, placed in the left lateral decubitus position, and the above IV anesthesia was administered. A digital rectal examination was performed and revealed negative without mass, lesions or tenderness. The Olympus CFQ-180-AL video colonoscope was placed in the patient's rectum under digital direction and advanced to the cecum. The cecum was identified by characteristic anatomy and ballottment. The ileocecal valve was identified. The preparation was poor. The scope was then withdrawn back through the cecum, ascending, transverse, descending and sigmoid colons. Carefull circumferential examination of the mucosa in these areas demonstrated a 6 mm polyp in the ascending colon that was snared and removed. The scope was then withdrawn into the rectum and retroflexed. The retroflexed view of the anal verge and rectum demonstrates no abnormalities. The scope was straightened, the colon was decompressed and the scope was withdrawn from the patient. The patient tolerated the procedure well and was taken to the PACU in good condition. Estimated Blood Loss:  none    Impression:  Colon polyp    Recommendations:  Await pathology. Repeat colonoscopy in 5 years.     Jairo Contreras MD, MD [facility]  1/28/2022

## 2022-02-08 NOTE — PROGRESS NOTES
[address]    Sonny Agudelo  1965    February 11, 2022      CC: \"I am still short of breath\"     HPI:  The patient is 49 y.o. male with a past medical history significant for MARIA ISABEL with CPAP and asthma. He was seen in the office due to shortness of breath. He completed an echocardiogram on 1/14/22 with normal results. He presents today for follow up. He continues to experience shortness of breath. He does not feel diuretic therapy has improved him symptoms. His anemia is overall resolved. He has tried to increase his aerobic activity and will use a stationary bike at home. The shortness of breath will present following exertion and he requires several minutes of rest before his breathing improves. He has never been seen by pulmonary. Review of Systems:  Constitutional: No fatigue, weakness, night sweats or fever. HEENT: No new vision difficulties or ringing in the ears. Respiratory: No new SOB, PND, orthopnea or cough. Cardiovascular: See HPI   GI: No n/v, diarrhea, constipation, abdominal pain or changes in bowel habits. No melena, no hematochezia  : No urinary frequency, urgency, incontinence, hematuria or dysuria. Skin: No cyanosis or skin lesions. Musculoskeletal: No new muscle or joint pain. Neurological: No syncope or TIA-like symptoms.   Psychiatric: No anxiety, insomnia or depression     Past Medical History:   Diagnosis Date    Anxiety     Anxiety 2005    Asthma 2019    exercise-induced asthma    Bursitis 2016    both shoulders    Depression     Depression 2019    Diverticula of colon     Diverticulitis 2019    of colon    GERD (gastroesophageal reflux disease)     Orthostatic hypotension 05/18/2021    Reflux esophagitis 2005    Sleep apnea     cpap     Past Surgical History:   Procedure Laterality Date    COLON SURGERY      COLONOSCOPY      COLONOSCOPY N/A 1/28/2022    COLONOSCOPY POLYPECTOMY SNARE/COLD BIOPSY performed [facility]  2014    closed    HIP SURGERY Bilateral L 2006, R 2007    Total Arthroplasty    JOINT REPLACEMENT  2019    hx bilateral hip replacements    LAPAROTOMY  14    EXPLORATORY LAPAROTOMY, BOWEL RESECTION, COLOSTOMY    LUNG SURGERY      OTHER SURGICAL HISTORY  14    OTHER SURGICAL HISTORY  10/5/15    Excision of sutures from abdominal wound    REVISION COLOSTOMY  14    Reversal    ROTATOR CUFF REPAIR  2019    THORACENTESIS      insert chest tube-age 25    UPPER GASTROINTESTINAL ENDOSCOPY N/A 2022    EGD BIOPSY performed by Aubree Rosen MD at [place] [address]     Family History   Problem Relation Age of Onset    Other Father         CHF    Cancer Father         prostate    Asthma Other     Heart Disease Other     High Blood Pressure Other     Cancer Brother         leukemia    Heart Attack Brother      Social History     Tobacco Use    Smoking status: Former Smoker     Packs/day: 0.25     Years: 30.00     Pack years: 7.50     Types: Cigarettes     Start date: 3/11/1985     Quit date: 2015     Years since quittin.2    Smokeless tobacco: Never Used   Vaping Use    Vaping Use: Never used   Substance Use Topics    Alcohol use: No    Drug use: No       No Known Allergies  Current Outpatient Medications   Medication Sig Dispense Refill    budesonide-formoterol (SYMBICORT) 80-4.5 MCG/ACT AERO Inhale 2 puffs into the lungs 2 times daily 3 each 1    cetirizine (ZYRTEC) 10 MG tablet Take 1 tablet by mouth daily as needed for Allergies 90 tablet 1    ibuprofen (ADVIL;MOTRIN) 200 MG tablet Take 800 mg by mouth every 6 hours as needed       buPROPion (WELLBUTRIN SR) 200 MG extended release tablet Take 200 mg by mouth 2 times daily      aspirin 81 MG EC tablet Take 81 mg by mouth daily      Saw Palmetto, Serenoa repens, (BL SAW PALMETTO PO) Take by mouth      furosemide (LASIX) 20 MG tablet Take 1 tablet by mouth daily 90 tablet 3    ferrous sulfate (IRON 325) 325 (65 Fe) MG tablet Take 325 mg by mouth daily (with breakfast)      albuterol sulfate  (90 Base) MCG/ACT inhaler INHALE 2 PUFFS BY MOUTH EVERY 6 HOURS AS NEEDED      acetaminophen (TYLENOL) 650 MG CR tablet Take 650 mg by mouth every 8 hours as needed for Pain      omeprazole (PRILOSEC) 20 MG capsule Take 20 mg by mouth 2 times daily        No current facility-administered medications for this visit. Physical Exam:   /76   Pulse 97   Ht 6' 2\" (1.88 m)   Wt 294 lb (133.4 kg)   SpO2 96%   BMI 37.75 kg/m²   No intake or output data in the 24 hours ending 02/11/22 0951  Wt Readings from Last 2 Encounters:   02/11/22 294 lb (133.4 kg)   01/28/22 282 lb (127.9 kg)     Constitutional: He is oriented to person, place, and time. He appears obese. In no acute distress. Head: Normocephalic and atraumatic. Neck: Neck supple. No JVD present. Carotid bruit is not present. No mass and no thyromegaly present. No lymphadenopathy present. Cardiovascular: Normal rate, regular rhythm, normal heart sounds and intact distal pulses. Exam reveals no gallop and no friction rub. No murmur heard. Pulmonary/Chest: Effort normal and breath sounds normal. No respiratory distress. He has no wheezes, rhonchi or rales. Abdominal: Soft, non-tender. Bowel sounds and aorta are normal. He exhibits no organomegaly, mass or bruit. Extremities: No edema, cyanosis, or clubbing. Pulses are 2+ radial/carotid/dorsalis pedis and posterior tibial bilaterally. Neurological: He is alert and oriented to person, place, and time. He has normal reflexes. No cranial nerve deficit. Coordination normal.   Skin: Skin is warm and dry. There is no rash or diaphoresis. Psychiatric: He has a normal mood and affect.  His speech is normal and behavior is normal.       Lab Review:   Lab Results   Component Value Date    TRIG 137 10/07/2021    HDL 57 10/07/2021    LDLCALC 144 10/07/2021    LABVLDL 27 10/07/2021     Lab Results   Component Value Date     09/30/2021    K 4.8 09/30/2021    BUN 13 09/30/2021    CREATININE 1.1 09/30/2021       Imaging:     Echo 1/14/22  Ejection fraction is visually estimated to be 55-60%. No regional wall motion abnormalities are noted. grade 1 diastolic dysfunction  Normal right ventricular size and function  No significant valvular heart disease      Assessment:  1. Shortness of breath   2. MARIA ISABEL with CPAP therapy  3. Orthostatic hypotension   4. Anemia, unspecified    Plan:  He continues to experience shortness of breath. However, his cardiac testing and physical exam is unremarkable. The Lasix has not improved his symptoms and I would not recommend him increasing this. I have encouraged him in weight loss with increased aerobic activity and improved dietary choices. I have discussed a referral to [facility] but he has deferred at this time and will look into resources on his own. I have personally reviewed all previous testing for this visit today including imaging, lab results and EKG as detailed above. I can see him at any time for new cardiac symptoms. This note was scribed in the presence of Miko Khan MD by [employer], RN. Physician Attestation:  The scribes documentation has been prepared under my direction and personally reviewed by me in its entirety. I, Dr. Obdulio Smith personally performed the services described in this documentation as scribed by my RN in my presence, and I confirm that the note above accurately reflects all work, treatment, procedures, and medical decision making performed by me.

## 2022-02-11 ENCOUNTER — OFFICE VISIT (OUTPATIENT)
Dept: CARDIOLOGY CLINIC | Age: 57
End: 2022-02-11
Payer: COMMERCIAL

## 2022-02-11 VITALS
WEIGHT: 294 LBS | HEART RATE: 97 BPM | SYSTOLIC BLOOD PRESSURE: 120 MMHG | BODY MASS INDEX: 37.73 KG/M2 | HEIGHT: 74 IN | OXYGEN SATURATION: 96 % | DIASTOLIC BLOOD PRESSURE: 76 MMHG

## 2022-02-11 DIAGNOSIS — G47.33 OSA (OBSTRUCTIVE SLEEP APNEA): ICD-10-CM

## 2022-02-11 DIAGNOSIS — R06.02 SOB (SHORTNESS OF BREATH): Primary | ICD-10-CM

## 2022-02-11 DIAGNOSIS — D64.9 ANEMIA, UNSPECIFIED TYPE: ICD-10-CM

## 2022-02-11 PROCEDURE — 99214 OFFICE O/P EST MOD 30 MIN: CPT | Performed by: INTERNAL MEDICINE

## 2022-04-13 ENCOUNTER — APPOINTMENT (OUTPATIENT)
Dept: CT IMAGING | Age: 57
End: 2022-04-13
Payer: COMMERCIAL

## 2022-04-13 ENCOUNTER — APPOINTMENT (OUTPATIENT)
Dept: GENERAL RADIOLOGY | Age: 57
End: 2022-04-13
Payer: COMMERCIAL

## 2022-04-13 ENCOUNTER — HOSPITAL ENCOUNTER (OUTPATIENT)
Age: 57
Setting detail: OBSERVATION
Discharge: HOME | End: 2022-04-14
Attending: EMERGENCY MEDICINE | Admitting: INTERNAL MEDICINE
Payer: COMMERCIAL

## 2022-04-13 DIAGNOSIS — R00.2 PALPITATIONS: Primary | ICD-10-CM

## 2022-04-13 LAB
ANION GAP SERPL CALCULATED.3IONS-SCNC: 12 MMOL/L (ref 3–16)
BASOPHILS ABSOLUTE: 0.1 K/UL (ref 0–0.2)
BASOPHILS RELATIVE PERCENT: 0.6 %
BUN BLDV-MCNC: 20 MG/DL (ref 7–20)
CALCIUM SERPL-MCNC: 9.3 MG/DL (ref 8.3–10.6)
CHLORIDE BLD-SCNC: 102 MMOL/L (ref 99–110)
CO2: 24 MMOL/L (ref 21–32)
CREAT SERPL-MCNC: 1 MG/DL (ref 0.9–1.3)
EOSINOPHILS ABSOLUTE: 0.2 K/UL (ref 0–0.6)
EOSINOPHILS RELATIVE PERCENT: 1.4 %
GFR AFRICAN AMERICAN: >60
GFR NON-AFRICAN AMERICAN: >60
GLUCOSE BLD-MCNC: 261 MG/DL (ref 70–99)
HCT VFR BLD CALC: 42.2 % (ref 40.5–52.5)
HEMOGLOBIN: 13.9 G/DL (ref 13.5–17.5)
LYMPHOCYTES ABSOLUTE: 3.7 K/UL (ref 1–5.1)
LYMPHOCYTES RELATIVE PERCENT: 30.5 %
MCH RBC QN AUTO: 29.9 PG (ref 26–34)
MCHC RBC AUTO-ENTMCNC: 33 G/DL (ref 31–36)
MCV RBC AUTO: 90.5 FL (ref 80–100)
MONOCYTES ABSOLUTE: 1.1 K/UL (ref 0–1.3)
MONOCYTES RELATIVE PERCENT: 8.7 %
NEUTROPHILS ABSOLUTE: 7.2 K/UL (ref 1.7–7.7)
NEUTROPHILS RELATIVE PERCENT: 58.8 %
PDW BLD-RTO: 13.9 % (ref 12.4–15.4)
PLATELET # BLD: 295 K/UL (ref 135–450)
PMV BLD AUTO: 8.1 FL (ref 5–10.5)
POTASSIUM REFLEX MAGNESIUM: 3.7 MMOL/L (ref 3.5–5.1)
RBC # BLD: 4.67 M/UL (ref 4.2–5.9)
SODIUM BLD-SCNC: 138 MMOL/L (ref 136–145)
TROPONIN: <0.01 NG/ML
WBC # BLD: 12.3 K/UL (ref 4–11)

## 2022-04-13 PROCEDURE — 70496 CT ANGIOGRAPHY HEAD: CPT

## 2022-04-13 PROCEDURE — 84484 ASSAY OF TROPONIN QUANT: CPT

## 2022-04-13 PROCEDURE — 6360000004 HC RX CONTRAST MEDICATION: Performed by: EMERGENCY MEDICINE

## 2022-04-13 PROCEDURE — 93005 ELECTROCARDIOGRAM TRACING: CPT | Performed by: EMERGENCY MEDICINE

## 2022-04-13 PROCEDURE — 70450 CT HEAD/BRAIN W/O DYE: CPT

## 2022-04-13 PROCEDURE — 71045 X-RAY EXAM CHEST 1 VIEW: CPT

## 2022-04-13 PROCEDURE — 85025 COMPLETE CBC W/AUTO DIFF WBC: CPT

## 2022-04-13 PROCEDURE — 83880 ASSAY OF NATRIURETIC PEPTIDE: CPT

## 2022-04-13 PROCEDURE — 36415 COLL VENOUS BLD VENIPUNCTURE: CPT

## 2022-04-13 PROCEDURE — 80048 BASIC METABOLIC PNL TOTAL CA: CPT

## 2022-04-13 RX ADMIN — IOPAMIDOL 75 ML: 755 INJECTION, SOLUTION INTRAVENOUS at 23:59

## 2022-04-13 NOTE — LETTER
[facility] Emergency Department  [address] [facility] [address]  Phone: [phone]               April 14, 2022    Patient: Silke Howard   YOB: 1965   Date of Visit: 4/13/2022       To Whom It May Concern:    Hailey Zurita was seen and treated in our emergency department on 4/13/2022. Wife may return to work on 4/15/2022.       Sincerely,       Nurse        Signature:__________________________________

## 2022-04-14 ENCOUNTER — APPOINTMENT (OUTPATIENT)
Dept: MRI IMAGING | Age: 57
End: 2022-04-14
Payer: COMMERCIAL

## 2022-04-14 ENCOUNTER — TELEPHONE (OUTPATIENT)
Dept: FAMILY MEDICINE CLINIC | Age: 57
End: 2022-04-14

## 2022-04-14 VITALS
SYSTOLIC BLOOD PRESSURE: 142 MMHG | HEART RATE: 97 BPM | HEIGHT: 74 IN | DIASTOLIC BLOOD PRESSURE: 95 MMHG | OXYGEN SATURATION: 94 % | WEIGHT: 300.49 LBS | BODY MASS INDEX: 38.56 KG/M2 | RESPIRATION RATE: 19 BRPM | TEMPERATURE: 97.7 F

## 2022-04-14 PROBLEM — R42 DIZZINESS: Status: ACTIVE | Noted: 2022-04-14

## 2022-04-14 LAB
ANION GAP SERPL CALCULATED.3IONS-SCNC: 13 MMOL/L (ref 3–16)
BUN BLDV-MCNC: 16 MG/DL (ref 7–20)
CALCIUM SERPL-MCNC: 8.9 MG/DL (ref 8.3–10.6)
CHLORIDE BLD-SCNC: 105 MMOL/L (ref 99–110)
CHOLESTEROL, TOTAL: 204 MG/DL (ref 0–199)
CO2: 20 MMOL/L (ref 21–32)
CREAT SERPL-MCNC: 0.9 MG/DL (ref 0.9–1.3)
EKG ATRIAL RATE: 109 BPM
EKG DIAGNOSIS: NORMAL
EKG P AXIS: 67 DEGREES
EKG P-R INTERVAL: 136 MS
EKG Q-T INTERVAL: 338 MS
EKG QRS DURATION: 88 MS
EKG QTC CALCULATION (BAZETT): 455 MS
EKG R AXIS: 14 DEGREES
EKG T AXIS: 64 DEGREES
EKG VENTRICULAR RATE: 109 BPM
GFR AFRICAN AMERICAN: >60
GFR NON-AFRICAN AMERICAN: >60
GLUCOSE BLD-MCNC: 167 MG/DL (ref 70–99)
HCT VFR BLD CALC: 39.5 % (ref 40.5–52.5)
HDLC SERPL-MCNC: 42 MG/DL (ref 40–60)
HEMOGLOBIN: 13.2 G/DL (ref 13.5–17.5)
LDL CHOLESTEROL CALCULATED: 135 MG/DL
LV EF: 60 %
LVEF MODALITY: NORMAL
MCH RBC QN AUTO: 30.2 PG (ref 26–34)
MCHC RBC AUTO-ENTMCNC: 33.4 G/DL (ref 31–36)
MCV RBC AUTO: 90.6 FL (ref 80–100)
PDW BLD-RTO: 14.2 % (ref 12.4–15.4)
PLATELET # BLD: 260 K/UL (ref 135–450)
PMV BLD AUTO: 7.6 FL (ref 5–10.5)
POTASSIUM REFLEX MAGNESIUM: 4.1 MMOL/L (ref 3.5–5.1)
PRO-BNP: 37 PG/ML (ref 0–124)
RBC # BLD: 4.36 M/UL (ref 4.2–5.9)
SODIUM BLD-SCNC: 138 MMOL/L (ref 136–145)
TRIGL SERPL-MCNC: 134 MG/DL (ref 0–150)
VLDLC SERPL CALC-MCNC: 27 MG/DL
WBC # BLD: 11 K/UL (ref 4–11)

## 2022-04-14 PROCEDURE — G0378 HOSPITAL OBSERVATION PER HR: HCPCS

## 2022-04-14 PROCEDURE — 6370000000 HC RX 637 (ALT 250 FOR IP): Performed by: INTERNAL MEDICINE

## 2022-04-14 PROCEDURE — 97165 OT EVAL LOW COMPLEX 30 MIN: CPT

## 2022-04-14 PROCEDURE — 6360000002 HC RX W HCPCS: Performed by: NURSE PRACTITIONER

## 2022-04-14 PROCEDURE — 80061 LIPID PANEL: CPT

## 2022-04-14 PROCEDURE — 85027 COMPLETE CBC AUTOMATED: CPT

## 2022-04-14 PROCEDURE — 36415 COLL VENOUS BLD VENIPUNCTURE: CPT

## 2022-04-14 PROCEDURE — C8929 TTE W OR WO FOL WCON,DOPPLER: HCPCS

## 2022-04-14 PROCEDURE — 83036 HEMOGLOBIN GLYCOSYLATED A1C: CPT

## 2022-04-14 PROCEDURE — 93010 ELECTROCARDIOGRAM REPORT: CPT | Performed by: INTERNAL MEDICINE

## 2022-04-14 PROCEDURE — 96372 THER/PROPH/DIAG INJ SC/IM: CPT

## 2022-04-14 PROCEDURE — 6370000000 HC RX 637 (ALT 250 FOR IP): Performed by: NURSE PRACTITIONER

## 2022-04-14 PROCEDURE — 97161 PT EVAL LOW COMPLEX 20 MIN: CPT | Performed by: PHYSICAL THERAPIST

## 2022-04-14 PROCEDURE — 70551 MRI BRAIN STEM W/O DYE: CPT

## 2022-04-14 PROCEDURE — 94640 AIRWAY INHALATION TREATMENT: CPT

## 2022-04-14 PROCEDURE — 99284 EMERGENCY DEPT VISIT MOD MDM: CPT

## 2022-04-14 PROCEDURE — 94760 N-INVAS EAR/PLS OXIMETRY 1: CPT

## 2022-04-14 PROCEDURE — 80048 BASIC METABOLIC PNL TOTAL CA: CPT

## 2022-04-14 RX ORDER — FUROSEMIDE 20 MG/1
20 TABLET ORAL DAILY
Status: DISCONTINUED | OUTPATIENT
Start: 2022-04-14 | End: 2022-04-14 | Stop reason: HOSPADM

## 2022-04-14 RX ORDER — BUDESONIDE AND FORMOTEROL FUMARATE DIHYDRATE 80; 4.5 UG/1; UG/1
2 AEROSOL RESPIRATORY (INHALATION) 2 TIMES DAILY
Status: DISCONTINUED | OUTPATIENT
Start: 2022-04-14 | End: 2022-04-14 | Stop reason: HOSPADM

## 2022-04-14 RX ORDER — FERROUS SULFATE TAB EC 324 MG (65 MG FE EQUIVALENT) 324 (65 FE) MG
324 TABLET DELAYED RESPONSE ORAL
Status: DISCONTINUED | OUTPATIENT
Start: 2022-04-14 | End: 2022-04-14 | Stop reason: HOSPADM

## 2022-04-14 RX ORDER — ONDANSETRON 2 MG/ML
4 INJECTION INTRAMUSCULAR; INTRAVENOUS EVERY 6 HOURS PRN
Status: DISCONTINUED | OUTPATIENT
Start: 2022-04-14 | End: 2022-04-14 | Stop reason: HOSPADM

## 2022-04-14 RX ORDER — PANTOPRAZOLE SODIUM 40 MG/1
40 TABLET, DELAYED RELEASE ORAL
Status: DISCONTINUED | OUTPATIENT
Start: 2022-04-14 | End: 2022-04-14 | Stop reason: HOSPADM

## 2022-04-14 RX ORDER — ASPIRIN 300 MG/1
300 SUPPOSITORY RECTAL DAILY
Status: DISCONTINUED | OUTPATIENT
Start: 2022-04-14 | End: 2022-04-14 | Stop reason: HOSPADM

## 2022-04-14 RX ORDER — LABETALOL HYDROCHLORIDE 5 MG/ML
10 INJECTION, SOLUTION INTRAVENOUS EVERY 10 MIN PRN
Status: DISCONTINUED | OUTPATIENT
Start: 2022-04-14 | End: 2022-04-14 | Stop reason: HOSPADM

## 2022-04-14 RX ORDER — BUPROPION HYDROCHLORIDE 100 MG/1
200 TABLET, EXTENDED RELEASE ORAL 2 TIMES DAILY
Status: DISCONTINUED | OUTPATIENT
Start: 2022-04-14 | End: 2022-04-14

## 2022-04-14 RX ORDER — ASPIRIN 81 MG/1
81 TABLET ORAL DAILY
Status: DISCONTINUED | OUTPATIENT
Start: 2022-04-14 | End: 2022-04-14 | Stop reason: HOSPADM

## 2022-04-14 RX ORDER — POLYETHYLENE GLYCOL 3350 17 G/17G
17 POWDER, FOR SOLUTION ORAL DAILY PRN
Status: DISCONTINUED | OUTPATIENT
Start: 2022-04-14 | End: 2022-04-14 | Stop reason: HOSPADM

## 2022-04-14 RX ORDER — ALBUTEROL SULFATE 90 UG/1
2 AEROSOL, METERED RESPIRATORY (INHALATION) EVERY 6 HOURS PRN
Status: DISCONTINUED | OUTPATIENT
Start: 2022-04-14 | End: 2022-04-14 | Stop reason: HOSPADM

## 2022-04-14 RX ORDER — ONDANSETRON 4 MG/1
4 TABLET, ORALLY DISINTEGRATING ORAL EVERY 8 HOURS PRN
Status: DISCONTINUED | OUTPATIENT
Start: 2022-04-14 | End: 2022-04-14 | Stop reason: HOSPADM

## 2022-04-14 RX ORDER — ATORVASTATIN CALCIUM 80 MG/1
80 TABLET, FILM COATED ORAL NIGHTLY
Status: DISCONTINUED | OUTPATIENT
Start: 2022-04-14 | End: 2022-04-14 | Stop reason: HOSPADM

## 2022-04-14 RX ADMIN — ENOXAPARIN SODIUM 40 MG: 40 INJECTION SUBCUTANEOUS at 08:29

## 2022-04-14 RX ADMIN — PANTOPRAZOLE SODIUM 40 MG: 40 TABLET, DELAYED RELEASE ORAL at 06:24

## 2022-04-14 RX ADMIN — BUDESONIDE AND FORMOTEROL FUMARATE DIHYDRATE 2 PUFF: 80; 4.5 AEROSOL RESPIRATORY (INHALATION) at 07:50

## 2022-04-14 RX ADMIN — FUROSEMIDE 20 MG: 20 TABLET ORAL at 08:29

## 2022-04-14 RX ADMIN — ASPIRIN 81 MG: 81 TABLET, COATED ORAL at 08:29

## 2022-04-14 ASSESSMENT — PAIN SCALES - GENERAL
PAINLEVEL_OUTOF10: 0
PAINLEVEL_OUTOF10: 0

## 2022-04-14 NOTE — PROGRESS NOTES
Discharge orders acknowledged by RN . Discharge teaching completed with pt and family. AVS reviewed and all questions answered. Medication regimen reviewed and pt understands schedule. Follow up appointments also reviewed with pt and resources given for discharge. Pt was sent electronic to be filled and understands schedule. IV removed. Bedside monitor removed from pt. 60+ minutes of education completed. Required core measures completed. Pt vitals WDL. Pt discharged with all belongings to home with wife. Pt transported off of unit via wheelchair. No complications.      Electronically signed by Dann Castleman, RN on 4/14/2022 at 2:19 PM

## 2022-04-14 NOTE — ED NOTES
Report called to Marciano Moreno on 5W. Denies further questions.      Juan Gurrola RN  04/14/22 [id number]

## 2022-04-14 NOTE — PLAN OF CARE
Problem: Falls - Risk of:  Goal: Will remain free from falls  Description: Will remain free from falls  4/14/2022 0952 by Tayler Castrejon RN  Outcome: Ongoing     Problem: Falls - Risk of:  Goal: Absence of physical injury  Description: Absence of physical injury  4/14/2022 0952 by Tayler Castrejon RN  Outcome: Ongoing     Problem: HEMODYNAMIC STATUS  Goal: Patient has stable vital signs and fluid balance  4/14/2022 0952 by Tayler Castrejon RN  Outcome: Ongoing     Problem: ACTIVITY INTOLERANCE/IMPAIRED MOBILITY  Goal: Mobility/activity is maintained at optimum level for patient  4/14/2022 3145 by Tayler Castrejon RN  Outcome: Ongoing     Problem: COMMUNICATION IMPAIRMENT  Goal: Ability to express needs and understand communication  4/14/2022 0952 by Tayler Castrejon RN  Outcome: Ongoing

## 2022-04-14 NOTE — TELEPHONE ENCOUNTER
----- Message from Jason Abbott MD sent at 4/14/2022  2:54 PM EDT -----  Needs 40 min hosp follow up   ----- Message -----  From: Lolly Burgos Incoming Lab Results From Soft (Ibrahima Sanders)  Sent: 4/13/2022  10:40 PM EDT  To: Jason Abbott MD

## 2022-04-14 NOTE — PROGRESS NOTES
Speech Language Pathology    SLP eval/tx order received per stroke r/o protocol. Patient met at bedside. SLP role/evaluation objectives discussed with patient; patient denies motor speech, receptive/expressive/cognitive language, and/or swallowing difficulties at this time. Patient politely requests to decline SLP eval at this time with request to pursue evaluation if medical team deems necessary as work-up progresses. ST to discontinue evaluation attempts at this time per patient's request. Please re-consult ST should status change and/or if medical team deems evaluation necessary despite patient's verbalized preferences at this time. Thank you. Patrick Pop, #[id number]  Speech-Language Pathologist  Portable phone: [phone]

## 2022-04-14 NOTE — ED PROVIDER NOTES
EMERGENCY DEPARTMENT ENCOUNTER      Pt Name: Radha Pierre  MRN: [id number]  [place] 1965  Date of evaluation: 4/13/2022  Provider: Shahla Starr MD    CHIEF COMPLAINT       Chief Complaint   Patient presents with    Tachycardia     patient reports having palpitations and heart rate at home was high in 180's. HISTORY OF PRESENT ILLNESS    Radha Pierre is a 56 y.o. male who presents to the emergency department with palpitations. Patient endorses intermittent palpitations has been going on the last few days. Noticed his heart rate is in the 150s to 180s today. Concern for A. fib. Also said intermittent dizziness has been going on the last week. No chest pain or shortness of breath. Never happened before. Has been going on intermittently. Currently without symptoms. No other associated symptoms. Nursing Notes were reviewed. Including nursing noted for FM, Surgical History, Past Medical History, Social History, vitals, and allergies; agree with all. REVIEW OF SYSTEMS       Review of Systems    Except as noted above the remainder of the review of systems was reviewed and negative.      PAST MEDICAL HISTORY     Past Medical History:   Diagnosis Date    Anxiety     Anxiety 2005    Asthma 2019    exercise-induced asthma    Bursitis 2016    both shoulders    Depression     Depression 2019    Diverticula of colon     Diverticulitis 2019    of colon    GERD (gastroesophageal reflux disease)     Orthostatic hypotension 05/18/2021    Reflux esophagitis 2005    Sleep apnea     cpap       SURGICAL HISTORY       Past Surgical History:   Procedure Laterality Date    COLON SURGERY      COLONOSCOPY      COLONOSCOPY N/A 1/28/2022    COLONOSCOPY POLYPECTOMY SNARE/COLD BIOPSY performed by [facility]ides [facility] at [facility]  2014    closed    HIP SURGERY Bilateral L 2006, R 2007    Total Arthroplasty    JOINT REPLACEMENT  12/11/2019    hx bilateral hip replacements    LAPAROTOMY  7-17-14    EXPLORATORY LAPAROTOMY, BOWEL RESECTION, COLOSTOMY    LUNG SURGERY      OTHER SURGICAL HISTORY  7/24/14    OTHER SURGICAL HISTORY  10/5/15    Excision of sutures from abdominal wound    REVISION COLOSTOMY  11/19/14    Reversal    ROTATOR CUFF REPAIR  2019    THORACENTESIS      insert chest tube-age 25    UPPER GASTROINTESTINAL ENDOSCOPY N/A 1/28/2022    EGD BIOPSY performed by Shyla Amado MD at [place] [address]       Current Discharge Medication List      CONTINUE these medications which have NOT CHANGED    Details   budesonide-formoterol (SYMBICORT) 80-4.5 MCG/ACT AERO Inhale 2 puffs into the lungs 2 times daily  Qty: 3 each, Refills: 1      cetirizine (ZYRTEC) 10 MG tablet Take 1 tablet by mouth daily as needed for Allergies  Qty: 90 tablet, Refills: 1      ibuprofen (ADVIL;MOTRIN) 200 MG tablet Take 800 mg by mouth every 6 hours as needed       buPROPion (WELLBUTRIN SR) 200 MG extended release tablet Take 200 mg by mouth 2 times daily      aspirin 81 MG EC tablet Take 81 mg by mouth daily      Saw Palmetto, Serenoa repens, (BL SAW PALMETTO PO) Take by mouth      furosemide (LASIX) 20 MG tablet Take 1 tablet by mouth daily  Qty: 90 tablet, Refills: 3      ferrous sulfate (IRON 325) 325 (65 Fe) MG tablet Take 325 mg by mouth daily (with breakfast)      albuterol sulfate  (90 Base) MCG/ACT inhaler INHALE 2 PUFFS BY MOUTH EVERY 6 HOURS AS NEEDED      acetaminophen (TYLENOL) 650 MG CR tablet Take 650 mg by mouth every 8 hours as needed for Pain      omeprazole (PRILOSEC) 20 MG capsule Take 20 mg by mouth 2 times daily              ALLERGIES     Patient has no known allergies.     FAMILY HISTORY        Family History   Problem Relation Age of Onset    Other Father         CHF    Cancer Father         prostate    Asthma Other     Heart Disease Other     High Blood Pressure Other     Cancer Brother         leukemia    Heart Attack Brother        SOCIAL HISTORY       Social History     Socioeconomic History    Marital status:      Spouse name: None    Number of children: None    Years of education: None    Highest education level: None   Occupational History    Occupation: NA   Tobacco Use    Smoking status: Former Smoker     Packs/day: 0.25     Years: 30.00     Pack years: 7.50     Types: Cigarettes     Start date: 3/11/1985     Quit date: 2015     Years since quittin.3    Smokeless tobacco: Never Used   Vaping Use    Vaping Use: Never used   Substance and Sexual Activity    Alcohol use: No    Drug use: No    Sexual activity: Yes     Partners: Female   Other Topics Concern    None   Social History Narrative    None     Social Determinants of Health     Financial Resource Strain:     Difficulty of Paying Living Expenses: Not on file   Food Insecurity:     Worried About Running Out of Food in the Last Year: Not on file    Miya of Food in the Last Year: Not on file   Transportation Needs:     Lack of Transportation (Medical): Not on file    Lack of Transportation (Non-Medical):  Not on file   Physical Activity:     Days of Exercise per Week: Not on file    Minutes of Exercise per Session: Not on file   Stress:     Feeling of Stress : Not on file   Social Connections:     Frequency of Communication with Friends and Family: Not on file    Frequency of Social Gatherings with Friends and Family: Not on file    Attends Adventism Services: Not on file    Active Member of Clubs or Organizations: Not on file    Attends Club or Organization Meetings: Not on file    Marital Status: Not on file   Intimate Partner Violence:     Fear of Current or Ex-Partner: Not on file    Emotionally Abused: Not on file    Physically Abused: Not on file    Sexually Abused: Not on file   Housing Stability:     Unable to Pay for Housing in the Last Year: Not on file    Number of [place] in the Last Year: Not on file    Unstable Housing in the Last Year: Not on file       PHYSICAL EXAM       ED Triage Vitals   BP Temp Temp Source Pulse Resp SpO2 Height Weight   04/13/22 2053 04/13/22 2053 04/13/22 2053 04/13/22 2053 04/13/22 2053 04/13/22 2053 04/14/22 0305 04/13/22 2053   (!) 132/91 98.4 °F (36.9 °C) Temporal 117 16 96 % 6' 2\" (1.88 m) (!) 300 lb 4.3 oz (136.2 kg)       Physical Exam  Vitals and nursing note reviewed. Constitutional:       General: He is not in acute distress. Appearance: He is well-developed. He is not diaphoretic. HENT:      Head: Normocephalic and atraumatic. Eyes:      General:         Right eye: No discharge. Left eye: No discharge. Pupils: Pupils are equal, round, and reactive to light. Neck:      Thyroid: No thyromegaly. Trachea: No tracheal deviation. Cardiovascular:      Rate and Rhythm: Normal rate and regular rhythm. Heart sounds: No murmur heard. Pulmonary:      Breath sounds: No wheezing or rales. Chest:      Chest wall: No tenderness. Abdominal:      General: There is no distension. Palpations: Abdomen is soft. There is no mass. Tenderness: There is no abdominal tenderness. There is no guarding or rebound. Musculoskeletal:         General: No tenderness or deformity. Normal range of motion. Cervical back: Normal range of motion. Skin:     General: Skin is warm. Coloration: Skin is not pale. Findings: No erythema or rash. Neurological:      Mental Status: He is alert. Cranial Nerves: No cranial nerve deficit. Motor: No abnormal muscle tone.       Coordination: Coordination normal.      Comments: NIH 0         DIAGNOSTIC RESULTS     EKG: All EKG's are interpreted by the Emergency Department Physician who either signs or Co-signs this chart in the absence of acardiologist.    EKG sinus rhythm nonspecific EKG changes no ectopy    RADIOLOGY:   Non-plain film images such as CT, Ultrasoundand MRI are read by the yakov. Yousif Cohn radiographic images are visualized and preliminarily interpreted by the emergency physician with the below findings:    Impression   1. Moderate to severe stenosis of dominant left vertebral artery at C5-C6. 2. No large vessel intracranial occlusion or high grade stenosis. 3. No significant stenoses of the internal carotid arteries. 4. 2.4 cm heterogeneous right thyroid nodule would be better evaluated by   outpatient ultrasound. 5. Other findings as described.         ED BEDSIDE ULTRASOUND:   Performed by ED Physician - none    LABS:  Labs Reviewed   BASIC METABOLIC PANEL W/ REFLEX TO MG FOR LOW K - Abnormal; Notable for the following components:       Result Value    Glucose 261 (*)     All other components within normal limits   CBC WITH AUTO DIFFERENTIAL - Abnormal; Notable for the following components:    WBC 12.3 (*)     All other components within normal limits   CBC - Abnormal; Notable for the following components:    Hemoglobin 13.2 (*)     Hematocrit 39.5 (*)     All other components within normal limits   LIPID PANEL - Abnormal; Notable for the following components:    Cholesterol, Total 204 (*)     LDL Calculated 135 (*)     All other components within normal limits   BASIC METABOLIC PANEL W/ REFLEX TO MG FOR LOW K - Abnormal; Notable for the following components:    CO2 20 (*)     Glucose 167 (*)     All other components within normal limits   TROPONIN   BRAIN NATRIURETIC PEPTIDE   HEMOGLOBIN A1C       All other labs were withinnormal range or not returned as of this dictation. EMERGENCY DEPARTMENT COURSE and DIFFERENTIAL DIAGNOSIS/MDM:     PMH, Surgical Hx, FH, Social Hx reviewed by myself (ETOH usage, Tobacco usage, Drug usage reviewed by myself, no pertinent Hx)- No Pertinent Hx     Old records were reviewed by me    73-year-old with palpitations and intermittent dizziness. Heart rate 150s to 180s with concern for A. fib. NIH is 0. Normal sinus rhythm on the monitor. CT shows vertebral stenosis. Will admit for further inpatient evaluation. CRITICAL CARE TIME   Total Critical Caretime was 21 minutes, excluding separately reportable procedures. There was a high probability of clinically significant/life threatening deterioration in the patient's condition which required my urgent intervention. PROCEDURES:  Unlessotherwise noted below, none    FINAL IMPRESSION      1.  Palpitations          DISPOSITION/PLAN   DISPOSITION Admitted 04/14/2022 01:42:13 AM    (Please note that portions ofthis note were completed with a voice recognition program.  Efforts were made to edit the dictations but occasionally words are mis-transcribed.)    Keara Man MD(electronically signed)  Attending Emergency Physician        Keara Man MD  [facility]/14/22 [facility]  04/16/22 [id number]

## 2022-04-14 NOTE — PROGRESS NOTES
Pt arrived via stretcher from ED to room 7282. Heart monitor connected and verified with [facility]. VS, assessment, and admission complete. 4 eyes assessment complete. Pt oriented to unit and room. Call light and bedside table in reach. All questions answered. Pt resting quietly in bed with no complaints or voiced needs at this time. Will continue to monitor. NIH=0 swallow screen passed.

## 2022-04-14 NOTE — PROGRESS NOTES
4 Eyes Skin Assessment     NAME:  Diana Banerjee  YOB: 1965  MEDICAL RECORD NUMBER:  [id number]    The patient is being assess for  Admission    I agree that 2 RN's have performed a thorough Head to Toe Skin Assessment on the patient. ALL assessment sites listed below have been assessed. Areas assessed by both nurses:    Head, Face, Ears, Shoulders, Back, Chest, Arms, Elbows, Hands, Sacrum. Buttock, Coccyx, Ischium and Legs. Feet and Heels        Does the Patient have a Wound?  No noted wound(s)       Blair Prevention initiated:  No   Wound Care Orders initiated:  No    Pressure Injury (Stage 3,4, Unstageable, DTI, NWPT, and Complex wounds) if present place consult order under [de-identified] No    New and Established Ostomies if present place consult order under : No      Nurse 1 eSignature: Electronically signed by Doris Lyn RN on 4/14/22 at 5:52 AM EDT    **SHARE this note so that the co-signing nurse is able to place an eSignature**    Nurse 2 eSignature: Electronically signed by Rosa Moran RN on 4/14/22 at 5:53 AM EDT

## 2022-04-14 NOTE — DISCHARGE SUMMARY
Hospital Medicine Discharge Summary    Patient ID: Ricardo Hamm      Patient's PCP: Marcela Jackson MD    Admit Date: 4/13/2022     Discharge Date:   04/14/22      Admitting Provider: Claudine Paris MD     Discharge Provider: Yung Boykin MD     Discharge Diagnoses: Active Hospital Problems    Diagnosis     Dizziness [R42]        The patient was seen and examined on day of discharge and this discharge summary is in conjunction with any daily progress note from day of discharge. Hospital Course:     56 y.o. male with PMHx of anxiety, depression and GERD  presented to [facility] with complaints of dizziness over the last few days. He noticed his heart rate into the 150s on his watch tonight and felt palpitations in his chest.  He was also very off balance and uncoordinated. He denies weakness to either extremity. No difficulty with speech or swallowing. Possible TIA/CVA  - with symptoms: dizziness/palpitations and coordination issues  - admit to OBS to RO TIA/CVA  - head CT neg for acute pathology  - CTA head/neck: Moderate to severe stenosis of dominant left vertebral artery at C5-C6. No large vessel intracranial occlusion or high-grade stenosis. Echo unremarkable, no diastolic dysfunction noted on this echo. MRI unremarkable. Patient was seen and cleared by neurology    Palpitations. We will attach event monitor on discharge     GERD  - continue protonix     Depression  - continue Wellbutrin  - supportive care         Physical Exam Performed:     BP (!) 142/95   Pulse 97   Temp 97.7 °F (36.5 °C) (Oral)   Resp 19   Ht 6' 2\" (1.88 m)   Wt (!) 300 lb 7.8 oz (136.3 kg)   SpO2 94%   BMI 38.58 kg/m²       General appearance:  No apparent distress, appears stated age and cooperative. HEENT:  Normal cephalic, atraumatic without obvious deformity. Pupils equal, round, and reactive to light. Extra ocular muscles intact. Conjunctivae/corneas clear.   Neck: Supple, with full range of motion. No jugular venous distention. Trachea midline. Respiratory:  Normal respiratory effort. Clear to auscultation, bilaterally without Rales/Wheezes/Rhonchi. Cardiovascular:  Regular rate and rhythm with normal S1/S2 without murmurs, rubs or gallops. Abdomen: Soft, non-tender, non-distended with normal bowel sounds. Musculoskeletal:  No clubbing, cyanosis or edema bilaterally. Full range of motion without deformity. Skin: Skin color, texture, turgor normal.  No rashes or lesions. Neurologic:  Neurovascularly intact without any focal sensory/motor deficits. Cranial nerves: II-XII intact, grossly non-focal.  Psychiatric:  Alert and oriented, thought content appropriate, normal insight  Capillary Refill: Brisk,< 3 seconds   Peripheral Pulses: +2 palpable, equal bilaterally       Labs: For convenience and continuity at follow-up the following most recent labs are provided:      CBC:    Lab Results   Component Value Date    WBC 11.0 04/14/2022    HGB 13.2 04/14/2022    HCT 39.5 04/14/2022     04/14/2022       Renal:    Lab Results   Component Value Date     04/14/2022    K 4.1 04/14/2022     04/14/2022    CO2 20 04/14/2022    BUN 16 04/14/2022    CREATININE 0.9 04/14/2022    CALCIUM 8.9 04/14/2022         Significant Diagnostic Studies    Radiology:   MRI brain without contrast   Final Result   No acute infarct or other acute intracranial process. RECOMMENDATIONS:   Unavailable         CTA HEAD NECK W CONTRAST   Final Result   1. Moderate to severe stenosis of dominant left vertebral artery at C5-C6. 2. No large vessel intracranial occlusion or high grade stenosis. 3. No significant stenoses of the internal carotid arteries. 4. 2.4 cm heterogeneous right thyroid nodule would be better evaluated by   outpatient ultrasound. 5. Other findings as described. CT HEAD WO CONTRAST   Final Result   No acute hemorrhage or midline shift. Other findings as described. XR CHEST PORTABLE   Final Result   No acute cardiopulmonary process                Consults:     IP CONSULT TO NEUROLOGY  IP CONSULT TO DIETITIAN    Disposition:  Home      Condition at Discharge: Stable    Discharge Instructions/Follow-up:    -Follow-up with cardiology, event monitor ordered.  -Continue on aspirin  -It was advised weight loss and risk factor modifications    Code Status:  Full Code     Activity: activity as tolerated    Diet: regular diet      Discharge Medications:     Current Discharge Medication List           Details   budesonide-formoterol (SYMBICORT) 80-4.5 MCG/ACT AERO Inhale 2 puffs into the lungs 2 times daily  Qty: 3 each, Refills: 1      ibuprofen (ADVIL;MOTRIN) 200 MG tablet Take 800 mg by mouth every 6 hours as needed       aspirin 81 MG EC tablet Take 81 mg by mouth daily      Saw Palmetto, Serenoa repens, (BL SAW PALMETTO PO) Take by mouth      furosemide (LASIX) 20 MG tablet Take 1 tablet by mouth daily  Qty: 90 tablet, Refills: 3      albuterol sulfate  (90 Base) MCG/ACT inhaler INHALE 2 PUFFS BY MOUTH EVERY 6 HOURS AS NEEDED      acetaminophen (TYLENOL) 650 MG CR tablet Take 650 mg by mouth every 8 hours as needed for Pain      omeprazole (PRILOSEC) 40 MG delayed release capsule Take 40 mg by mouth 2 times daily              Time Spent on discharge is more than 30 minutes in the examination, evaluation, counseling and review of medications and discharge plan. Signed:    Rosey Kruse MD   4/14/2022      Thank you Gama Restrepo MD for the opportunity to be involved in this patient's care. If you have any questions or concerns please feel free to contact me at [phone].

## 2022-04-14 NOTE — PROGRESS NOTES
Physical Therapy    [facility]  Initial Assessment/Discharge Note    NAME: Ricardo Hamm  : 1965  MRN: [id number]    Date of Service: 2022    Discharge Recommendations:  Home independently   PT Equipment Recommendations  Equipment Needed: No  Ricardo Hamm scored a 24/ on the AM-PAC short mobility form. At this time, no further PT is recommended upon discharge. Recommend patient returns to prior setting with prior services. Assessment   Assessment: pt is a 55 yo male who was adm to hosp with dizziness and fell back on his couch at home after getting up too quickly per pt report; pt appears at his baseline; no dizziness reported with position changes however did report when he looked side to side with walking that he may have felt slightly dizzy; no LOB during session. Pt safe to return home Ind at discharge; no further therapy indicated as does not appear to be vestibular in nature. Will sign off from therapy at this time  Prognosis: Good  Decision Making: Low Complexity  PT Education: PT Role;General Safety  Barriers to Learning: none  REQUIRES PT FOLLOW UP: No  Activity Tolerance  Activity Tolerance: Patient Tolerated treatment well       Patient Diagnosis(es): The encounter diagnosis was Palpitations. has a past medical history of Anxiety, Anxiety, Asthma, Bursitis, Depression, Depression, Diverticula of colon, Diverticulitis, GERD (gastroesophageal reflux disease), Orthostatic hypotension, Reflux esophagitis, and Sleep apnea. has a past surgical history that includes hip surgery (Bilateral, L , R ); Lung surgery; laparotomy (14); other surgical history (14); Colon surgery; colostomy; Revision Colostomy (14); other surgical history (10/5/15); joint replacement (2019); Rotator cuff repair (); Colonoscopy; thoracentesis; colostomy ();  Colonoscopy (N/A, 2022); and Upper gastrointestinal endoscopy (N/A, 1/28/2022). Restrictions     Vision/Hearing  Vision: Impaired  Vision Exceptions: Wears glasses at all times  Hearing: Within functional limits     Subjective  General  Chart Reviewed: Yes  Additional Pertinent Hx: Per H&P note: \"56 y.o. male with PMHx of anxiety, depression and GERD  presented to [facility] with complaints of dizziness over the last few days. He noticed his heart rate into the 150s on his watch tonight and felt palpitations in his chest.  He was also very off balance and uncoordinated. He denies weakness to either extremity. No difficulty with speech or swallowing. On my exam I do not appreciate any unilateral weakness or any facial droop. \"  Response To Previous Treatment: Not applicable  Family / Caregiver Present: No  Referring Practitioner: GUIDO Alvarez CNP  Referral Date : 04/14/22  Follows Commands: Within Functional Limits  Subjective  Subjective: pt very pleasant and agreeable working with therapy  Pain Screening  Patient Currently in Pain: Denies          Orientation  Orientation  Overall Orientation Status: Within Functional Limits  Social/Functional History  Social/Functional History  Lives With: Spouse  Type of Home: Apartment (condo- elevator)  Home Layout: One level  Home Access: Level entry,Elevator  Bathroom Shower/Tub: Tub/Shower unit,Curtain  Bathroom Toilet: Standard  Bathroom Equipment: Grab bars in shower  Bathroom Accessibility: Walker accessible  Home Equipment:  (cpap)  ADL Assistance: Independent  Homemaking Assistance: Independent (share chores with wife)  Ambulation Assistance: Independent  Transfer Assistance: Independent  Active : Yes  Occupation: Full time employment  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 [employer]  Overall Cognitive Status: WFL    Objective          AROM RLE (degrees)  RLE AROM: WFL  AROM LLE (degrees)  LLE AROM : WFL  Strength RLE  Strength RLE: WFL  Strength LLE  Strength LLE: WFL     Sensation  Overall Sensation Status: WFL  Bed mobility  Comment: pt up in chair at beginning and end of session  Transfers  Sit to Stand: Independent  Stand to sit:  Independent  Ambulation  Ambulation?: Yes  Ambulation 1  Surface: level tile  Device: No Device  Assistance: Independent  Gait Deviations: None  Distance: 150'  Stairs/Curb  Stairs?: Yes  Stairs  # Steps : 5  Stairs Height: 8\"  Rails: Left ascending  Assistance: Independent     Balance  Sitting - Static: Good  Sitting - Dynamic: Good  Standing - Static: Good  Standing - Dynamic: Good        Plan   Plan  Plan Comment: no further therapy indicated  Safety Devices  Type of devices: Call light within reach,Left in chair,Nurse notified,All fall risk precautions in place    G-Code       OutComes Score                                                  AM-PAC Score  AM-PAC Inpatient Mobility Raw Score : 24 (04/14/22 0806)  AM-PAC Inpatient T-Scale Score : 61.14 (04/14/22 0806)  Mobility Inpatient CMS 0-100% Score: 0 (04/14/22 0806)  Mobility Inpatient CMS G-Code Modifier : [facility] (04/14/22 8114)          Goals          Therapy Time   Individual Concurrent Group Co-treatment   Time In 0726         Time Out 0744         Minutes 18                 TIARRA PUCKETT PT    Electronically signed by TIARRA PUCKETT PT on 4/14/2022 at 8:07 AM

## 2022-04-14 NOTE — H&P
Hospital Medicine History & Physical      PCP: Braulio Wright MD    Date of Admission: 4/13/2022    Date of Service: Pt seen/examined on 4/14/2022 and Placed in Observation. Chief Complaint:  Dizziness, palpitations and problems with coordination      History Of Present Illness:      56 y.o. male with PMHx of anxiety, depression and GERD  presented to [facility] with complaints of dizziness over the last few days. He noticed his heart rate into the 150s on his watch tonight and felt palpitations in his chest.  He was also very off balance and uncoordinated. He denies weakness to either extremity. No difficulty with speech or swallowing. On my exam I do not appreciate any unilateral weakness or any facial droop.     Past Medical History:          Diagnosis Date    Anxiety     Anxiety 2005    Asthma 2019    exercise-induced asthma    Bursitis 2016    both shoulders    Depression     Depression 2019    Diverticula of colon     Diverticulitis 2019    of colon    GERD (gastroesophageal reflux disease)     Orthostatic hypotension 05/18/2021    Reflux esophagitis 2005    Sleep apnea     cpap       Past Surgical History:          Procedure Laterality Date    COLON SURGERY      COLONOSCOPY      COLONOSCOPY N/A 1/28/2022    COLONOSCOPY POLYPECTOMY SNARE/COLD BIOPSY performed by Homa Hou [facility] at [facility]  2014    closed    HIP SURGERY Bilateral L 2006, R 2007    Total Arthroplasty    JOINT REPLACEMENT  12/11/2019    hx bilateral hip replacements    LAPAROTOMY  7-17-14    EXPLORATORY LAPAROTOMY, BOWEL RESECTION, COLOSTOMY    LUNG SURGERY      OTHER SURGICAL HISTORY  7/24/14    OTHER SURGICAL HISTORY  10/5/15    Excision of sutures from abdominal wound    REVISION COLOSTOMY  11/19/14    Reversal    ROTATOR CUFF REPAIR  2019    THORACENTESIS      insert chest tube-age 25    UPPER GASTROINTESTINAL ENDOSCOPY N/A 1/28/2022    EGD BIOPSY performed by Nanette Carbajal MD at [facility] ENDOSCOPY       Medications Prior to Admission:      Prior to Admission medications    Medication Sig Start Date End Date Taking? Authorizing Provider   budesonide-formoterol (SYMBICORT) 80-4.5 MCG/ACT AERO Inhale 2 puffs into the lungs 2 times daily 1/25/22   Julio Perez MD   cetirizine (ZYRTEC) 10 MG tablet Take 1 tablet by mouth daily as needed for Allergies 1/25/22   Julio Perez MD   ibuprofen (ADVIL;MOTRIN) 200 MG tablet Take 800 mg by mouth every 6 hours as needed     Historical Provider, MD   buPROPion (WELLBUTRIN SR) 200 MG extended release tablet Take 200 mg by mouth 2 times daily    Historical Provider, MD   aspirin 81 MG EC tablet Take 81 mg by mouth daily    Historical Provider, MD   Saw [place], Serenoa repens, (BL SAW PALMETTO PO) Take by mouth    Historical Provider, MD   furosemide (LASIX) 20 MG tablet Take 1 tablet by mouth daily 12/3/21   Mickie Young MD   ferrous sulfate (IRON 325) 325 (65 Fe) MG tablet Take 325 mg by mouth daily (with breakfast)    Historical Provider, MD   albuterol sulfate  (90 Base) MCG/ACT inhaler INHALE 2 PUFFS BY MOUTH EVERY 6 HOURS AS NEEDED 8/10/21   Historical Provider, MD   acetaminophen (TYLENOL) 650 MG CR tablet Take 650 mg by mouth every 8 hours as needed for Pain    Historical Provider, MD   omeprazole (PRILOSEC) 20 MG capsule Take 20 mg by mouth 2 times daily     Historical Provider, MD       Allergies:  Patient has no known allergies. Social History:        TOBACCO:   reports that he quit smoking about 6 years ago. His smoking use included cigarettes. He started smoking about 37 years ago. He has a 7.50 pack-year smoking history. He has never used smokeless tobacco.  ETOH:   reports no history of alcohol use.       Family History:      Reviewed in detail positive as follows:        Problem Relation Age of Onset    Other Father         CHF    Cancer Father         prostate    Asthma Other  Heart Disease Other     High Blood Pressure Other     Cancer Brother         leukemia    Heart Attack Brother        REVIEW OF SYSTEMS:   Pertinent positives as noted in the HPI. All other systems reviewed and negative. PHYSICAL EXAM PERFORMED:    /89   Pulse 95   Temp 98.4 °F (36.9 °C) (Temporal)   Resp 19   Wt (!) 300 lb 4.3 oz (136.2 kg)   SpO2 97%   BMI 38.55 kg/m²     General appearance:  Well developed, well nourished,  male lying on hospital cart in no apparent distress, appears stated age and cooperative. HEENT:  Normal cephalic, atraumatic without obvious deformity. Pupils equal, round, and reactive to light. Conjunctivae/corneas clear. Neck: Supple, with full range of motion. No jugular venous distention. Trachea midline. Respiratory:  Normal respiratory effort. Clear to auscultation, bilaterally without accessory muscle use. Cardiovascular:  Regular rate and rhythm without murmurs, no lower extremity edema. Abdomen: Soft, non-tender, non-distended, without rebound or guarding. Normal bowel sounds. Musculoskeletal:  Moves all extremities equally. Full range of motion without deformity. Skin: Skin warm, dry and intact. No rashes or lesions. Neurologic:  Neurovascularly intact without any focal sensory/motor deficits. Cranial nerves: II-XII intact, grossly non-focal.  Psychiatric:  Alert and oriented, thought content appropriate, normal insight  Capillary Refill: Brisk,< 3 seconds   Peripheral Pulses: +2 palpable, equal bilaterally       Labs:     Recent Labs     04/13/22 2224   WBC 12.3*   HGB 13.9   HCT 42.2        Recent Labs     04/13/22  2224      K 3.7      CO2 24   BUN 20   CREATININE 1.0   CALCIUM 9.3     No results for input(s): AST, ALT, BILIDIR, BILITOT, ALKPHOS in the last 72 hours. No results for input(s): INR in the last 72 hours.   Recent Labs     04/13/22  2224   TROPONINI <0.01       Urinalysis:      Lab Results   Component Value Date    NITRU Negative 05/26/2016    WBCUA 1 05/26/2016    RBCUA 5 05/26/2016    BLOODU TRACE 05/26/2016    SPECGRAV 1.025 05/26/2016    GLUCOSEU Negative 05/26/2016       Radiology:       CTA HEAD NECK W CONTRAST   Final Result   1. Moderate to severe stenosis of dominant left vertebral artery at C5-C6. 2. No large vessel intracranial occlusion or high grade stenosis. 3. No significant stenoses of the internal carotid arteries. 4. 2.4 cm heterogeneous right thyroid nodule would be better evaluated by   outpatient ultrasound. 5. Other findings as described. CT HEAD WO CONTRAST   Final Result   No acute hemorrhage or midline shift. Other findings as described. XR CHEST PORTABLE   Final Result   No acute cardiopulmonary process         MRI brain without contrast    (Results Pending)       ASSESSMENT:    Active Hospital Problems    Diagnosis Date Noted    Dizziness [R42] 04/14/2022         PLAN:    Possible TIA/CVA  - with symptoms: dizziness/palpitations and coordination issues  - admit to OBS to RO TIA/CVA  - head CT neg for acute pathology  - CTA head/neck: Moderate to severe stenosis of dominant left vertebral artery at C5-C6. No large vessel intracranial occlusion or high-grade stenosis. - MRI and ECHO in a.m.  - ASA, statin  - consult neurology   - check lipids, HBA1c  - allow permissive HTN, SBP < 220, DBP < 110    GERD  - continue protonix    Depression  - continue Wellbutrin  - supportive care    DVT Prophylaxis: Lovenox  Diet: Diet NPO  Code Status: Full Code    PT/OT Eval Status: in process    Dispo - Observation       [address], APRN - CNP    Thank you Zully Mon MD for the opportunity to be involved in this patient's care. If you have any questions or concerns please feel free to contact me at [phone].

## 2022-04-14 NOTE — CARE COORDINATION
CASE MANAGEMENT DISCHARGE SUMMARY:    DISCHARGE DATE: 4/14/2022    DISCHARGED TO: Home with wife  Patient independent. Patient reports no needs.      PCP: Dr Heidi Zuñiga: Wife at bedside              TIME: patient to coordinate with RN       HOANG Gtz, [place], Social Work  [phone]  Electronically signed by HOANG Gtz, [facility] on 4/14/2022 at 1:23 PM

## 2022-04-14 NOTE — PROGRESS NOTES
Occupational Therapy   Occupational Therapy Initial Assessment and Discharge summary    Date: 2022   Patient Name: María Wray  MRN: [id number]     : 1965    Date of Service: 2022    Discharge Recommendations:  Home with assist PRN,Defer OT at this time       Assessment   Assessment: Pt presents with dizziness, medical workup ongoing. Pt lives at home with wife and is independent with self care and functional mobility. Pt is not demonstrating any functional deficits. No OT goals identified, d/c home when medically stable. Prognosis: Good  Decision Making: Low Complexity  History: see above  Exam: mobility, self care  Assistance / Modification: none  OT Education: OT Role;Plan of Care  Barriers to Learning: none  REQUIRES OT FOLLOW UP: No  Activity Tolerance  Activity Tolerance: Patient Tolerated treatment well  Safety Devices  Safety Devices in place: Yes  Type of devices: Call light within reach;Nurse notified; Left in chair           Patient Diagnosis(es): The encounter diagnosis was Palpitations. has a past medical history of Anxiety, Anxiety, Asthma, Bursitis, Depression, Depression, Diverticula of colon, Diverticulitis, GERD (gastroesophageal reflux disease), Orthostatic hypotension, Reflux esophagitis, and Sleep apnea. has a past surgical history that includes hip surgery (Bilateral, L , R ); Lung surgery; laparotomy (14); other surgical history (14); Colon surgery; colostomy; Revision Colostomy (14); other surgical history (10/5/15); joint replacement (2019); Rotator cuff repair (); Colonoscopy; thoracentesis; colostomy (); Colonoscopy (N/A, 2022); and Upper gastrointestinal endoscopy (N/A, 2022).            Restrictions       Subjective   General  Chart Reviewed: Amandeep Acosta and Physical  Additional Pertinent Hx: Per Josefina Powell, CNP, \"22 y.o. male with PMHx of anxiety, depression and GERD  presented to monitor per self)  Tone RUE  RUE Tone: Normotonic  Tone LUE  LUE Tone: Normotonic  Coordination  Movements Are Fluid And Coordinated: Yes        Transfers  Sit to stand: Independent  Stand to sit: Independent     Cognition  Overall Cognitive Status: WFL        Sensation  Overall Sensation Status: WFL        LUE AROM (degrees)  LUE AROM : WFL  RUE AROM (degrees)  RUE AROM : WFL  LUE Strength  Gross LUE Strength: WFL  RUE Strength  Gross RUE Strength: WFL                   Plan   Plan  Times per week: NA         OutComes Score           Donovan Manual scored a 24/24 on the [facility] ADL Inpatient form. At this time, no further OT is recommended upon discharge due to independence. Recommend patient returns to prior setting with prior services.                                              AM-PAC Score        AM-PAC Inpatient Daily Activity Raw Score: 24 (04/14/22 0808)  AM-PAC Inpatient ADL T-Scale Score : 57.54 (04/14/22 5631)  ADL Inpatient CMS 0-100% Score: 0 (04/14/22 3399)  ADL Inpatient CMS G-Code Modifier : [facility] (04/14/22 0683)    Goals  Short term goals  Time Frame for Short term goals: NA- No OT goals identified  Patient Goals   Patient goals : Pt's goal is to return home asap       Therapy Time   Individual Concurrent Group Co-treatment   Time In [address]         Time Out 2963         Minutes 20         Timed Code Treatment Minutes: Jeronimo [address] [place] [address]

## 2022-04-14 NOTE — CONSULTS
Neurology Consult Note  Reason for Consult: posterior stroke rule out    Chief complaint: dizziness, heart palpitations    Dr Branden Cam MD asked me to see Shaun Chavez in consultation for evaluation of posterior stroke rule out    History of Present Illness:  Shaun Chavez is a 56 y.o. male who presents with heart palpitations, dizziness. I obtained my information via interview w/ the patient, supplemented by chart review. The patient tells me that he has had dizziness at least for the past year. Some of this has been attributed to a diagnosis of orthostatic hypotension, though he suggests it is not always positional.  He had been on midrodrine though was having a tough time w/ his blood pressure management. He has been followed by Cardiology at [id number] [place] [address] for SOB/OH. He says that two days ago he had a situation where he got up and felt dizzy, falling w/out LOC or injury. This was a first for him. He felt fine afterwards. The patient has a fit bit. He says yesterday he noticed that his heart rate was increased. He started feeling some shortness of breath and heart racing/palpitations. He checked his BP and was in the 130s/80s. He also has a pulse oximeter and confirmed his HR was elevated, it reading in the 180s-190s. He denies any additional focal neurologic deficits during all of this. He decided to come to the ED in order to be evaluated. BP was 132/91. CT head was w/out any acute findings. CTA head/neck w/ L vertebral artery stenosis. Neurologically he was and remains intact.       Medical History:  Past Medical History:   Diagnosis Date    Anxiety     Anxiety 2005    Asthma 2019    exercise-induced asthma    Bursitis 2016    both shoulders    Depression     Depression 2019    Diverticula of colon     Diverticulitis 2019    of colon    GERD (gastroesophageal reflux disease)     Orthostatic hypotension 05/18/2021    Reflux esophagitis 2005    Sleep apnea cpap     Past Surgical History:   Procedure Laterality Date    COLON SURGERY      COLONOSCOPY      COLONOSCOPY N/A 1/28/2022    COLONOSCOPY POLYPECTOMY SNARE/COLD BIOPSY performed by Kai Hines MD at [facility]  2014    closed    HIP SURGERY Bilateral L 2006, R 2007    Total Arthroplasty    JOINT REPLACEMENT  12/11/2019    hx bilateral hip replacements    LAPAROTOMY  7-17-14    EXPLORATORY LAPAROTOMY, BOWEL RESECTION, COLOSTOMY    LUNG SURGERY      OTHER SURGICAL HISTORY  7/24/14    OTHER SURGICAL HISTORY  10/5/15    Excision of sutures from abdominal wound    REVISION COLOSTOMY  11/19/14    Reversal    ROTATOR CUFF REPAIR  2019    THORACENTESIS      insert chest tube-age 25    UPPER GASTROINTESTINAL ENDOSCOPY N/A 1/28/2022    EGD BIOPSY performed by Kai Hines MD at [place] [address]     Scheduled Meds:   ferrous sulfate  324 mg Oral Daily with breakfast    furosemide  20 mg Oral Daily    pantoprazole  40 mg Oral QAM AC    enoxaparin  40 mg SubCUTAneous Daily    aspirin  81 mg Oral Daily    Or    aspirin  300 mg Rectal Daily    atorvastatin  80 mg Oral Nightly    budesonide-formoterol  2 puff Inhalation BID     Medications Prior to Admission:   budesonide-formoterol (SYMBICORT) 80-4.5 MCG/ACT AERO, Inhale 2 puffs into the lungs 2 times daily  cetirizine (ZYRTEC) 10 MG tablet, Take 1 tablet by mouth daily as needed for Allergies  ibuprofen (ADVIL;MOTRIN) 200 MG tablet, Take 800 mg by mouth every 6 hours as needed   aspirin 81 MG EC tablet, Take 81 mg by mouth daily  Saw [place], Serenoa repens, (BL SAW [place] PO), Take by mouth  buPROPion (WELLBUTRIN SR) 200 MG extended release tablet, Take 200 mg by mouth 2 times daily (Patient not taking: Reported on 4/14/2022)  furosemide (LASIX) 20 MG tablet, Take 1 tablet by mouth daily  ferrous sulfate (IRON 325) 325 (65 Fe) MG tablet, Take 325 mg by mouth daily (with breakfast)  albuterol sulfate  (90 Base) MCG/ACT inhaler, INHALE 2 PUFFS BY MOUTH EVERY 6 HOURS AS NEEDED  acetaminophen (TYLENOL) 650 MG CR tablet, Take 650 mg by mouth every 8 hours as needed for Pain  omeprazole (PRILOSEC) 40 MG delayed release capsule, Take 40 mg by mouth 2 times daily     No Known Allergies    Family History   Problem Relation Age of Onset    Other Father         CHF    Cancer Father         prostate    Asthma Other     Heart Disease Other     High Blood Pressure Other     Cancer Brother         leukemia    Heart Attack Brother      Social History     Tobacco Use   Smoking Status Former Smoker    Packs/day: 0.25    Years: 30.00    Pack years: 7.50    Types: Cigarettes    Start date: 3/11/1985   [facility] Quit date: 2015    Years since quittin.3   Smokeless Tobacco Never Used     Social History     Substance and Sexual Activity   Drug Use No     Social History     Substance and Sexual Activity   Alcohol Use No     ROS  Constitutional- No weight loss or fevers  Eyes- No diplopia. No photophobia. Ears/nose/throat- No dysphagia. No Dysarthria  Cardiovascular- + palpitations. No chest pain  Respiratory- No dyspnea. No Cough  Gastrointestinal- No Abdominal pain. No Vomiting. Genitourinary- No incontinence. No urinary retention  Musculoskeletal- No myalgia. No arthralgia  Skin- No rash. No easy bruising. Psychiatric- No depression. No anxiety  Endocrine- No diabetes. No thyroid issues. Hematologic- No bleeding difficulty. No fatigue  Neurologic- No weakness. No Headache. Exam:  Blood pressure (!) 133/91, pulse 90, temperature 97.6 °F (36.4 °C), temperature source Oral, resp. rate 16, height 6' 2\" (1.88 m), weight (!) 300 lb 7.8 oz (136.3 kg), SpO2 96 %. Constitutional    Vital signs: BP, HR, and RR reviewed   General alert, no distress  Eyes: unable to visualize the fundi  Cardiovascular: no peripheral edema. Psychiatric: cooperative with examination, no psychotic behavior noted.   Neurologic  Mental status: orientation to person, place, time. General fund of knowledge grossly intact   Memory grossly intact   Attention intact as able to attend well to the exam     Language fluent in conversation   Comprehension intact; follows simple commands  Cranial nerves:   CN2: visual fields full   CN 3,4,6: extraocular muscles intact. CN5: facial sensation symmetric   CN7: face symmetric without dysarthria  CN8: hearing grossly intact  CN9: palate elevated symmetrically  CN11: trap full strength on shoulder shrug  CN12: tongue midline with protrusion  Strength: good strength in all 4 extremities   Deep tendon reflexes: normal in all 4 extremities  Sensory: light touch intact in all 4 extremities. Cerebellar/coordination: finger nose finger normal without ataxia  Tone: normal in all 4 extremities  Gait: deferred at this time for safety. Labs  Glucose 167  Na 138  K 4.1  BUN 16  Cr 0.9    WBC 11.0K  Hg 13.2  Platelets 223        Studies  MRI brain w/o 4/14/22, independently reviewed  No acute intracranial abnormality. CTA head/neck 4/13/22, independently reviewed  1. Moderate to severe stenosis of dominant left vertebral artery at C5-C6. 2. No large vessel intracranial occlusion or high grade stenosis. 3. No significant stenoses of the internal carotid arteries. 4. 2.4 cm heterogeneous right thyroid nodule would be better evaluated by   outpatient ultrasound. 5. Other findings as described. TTE 4/14/22   A bubble study was performed and fails to show any evidence of right to left    shunting. Normal LV size and wall motion. EF is    60%. Normal diastolic function. The left atrium is normal in size. The right ventricle is normal in size and function. Trivial Mitral and Tricuspid regurgitation. EKG 4/13/22  ST.     Impression  1. The patient has chronic, intermittent dizziness. This was attributed to orthostatic hypotension.   He started having heart palpitations and was found

## 2022-04-15 LAB
ESTIMATED AVERAGE GLUCOSE: 162.8 MG/DL
HBA1C MFR BLD: 7.3 %

## 2022-05-04 ENCOUNTER — OFFICE VISIT (OUTPATIENT)
Dept: FAMILY MEDICINE CLINIC | Age: 57
End: 2022-05-04
Payer: COMMERCIAL

## 2022-05-04 VITALS
SYSTOLIC BLOOD PRESSURE: 128 MMHG | DIASTOLIC BLOOD PRESSURE: 84 MMHG | RESPIRATION RATE: 14 BRPM | BODY MASS INDEX: 38.65 KG/M2 | WEIGHT: 301 LBS | HEART RATE: 104 BPM | OXYGEN SATURATION: 98 %

## 2022-05-04 DIAGNOSIS — E11.9 TYPE 2 DIABETES MELLITUS WITHOUT COMPLICATION, WITHOUT LONG-TERM CURRENT USE OF INSULIN (HCC): ICD-10-CM

## 2022-05-04 DIAGNOSIS — G47.00 INSOMNIA, UNSPECIFIED TYPE: Primary | ICD-10-CM

## 2022-05-04 DIAGNOSIS — G47.30 SLEEP APNEA, UNSPECIFIED TYPE: ICD-10-CM

## 2022-05-04 PROCEDURE — 1036F TOBACCO NON-USER: CPT | Performed by: INTERNAL MEDICINE

## 2022-05-04 PROCEDURE — 3051F HG A1C>EQUAL 7.0%<8.0%: CPT | Performed by: INTERNAL MEDICINE

## 2022-05-04 PROCEDURE — 99214 OFFICE O/P EST MOD 30 MIN: CPT | Performed by: INTERNAL MEDICINE

## 2022-05-04 PROCEDURE — G8427 DOCREV CUR MEDS BY ELIG CLIN: HCPCS | Performed by: INTERNAL MEDICINE

## 2022-05-04 PROCEDURE — 2022F DILAT RTA XM EVC RTNOPTHY: CPT | Performed by: INTERNAL MEDICINE

## 2022-05-04 PROCEDURE — G8417 CALC BMI ABV UP PARAM F/U: HCPCS | Performed by: INTERNAL MEDICINE

## 2022-05-04 PROCEDURE — 3017F COLORECTAL CA SCREEN DOC REV: CPT | Performed by: INTERNAL MEDICINE

## 2022-05-04 RX ORDER — ROSUVASTATIN CALCIUM 5 MG/1
5 TABLET, COATED ORAL NIGHTLY
Qty: 90 TABLET | Refills: 1 | Status: SHIPPED | OUTPATIENT
Start: 2022-05-04 | End: 2022-05-04 | Stop reason: SDUPTHER

## 2022-05-04 RX ORDER — METFORMIN HYDROCHLORIDE 500 MG/1
TABLET, EXTENDED RELEASE ORAL
Qty: 180 TABLET | Refills: 0 | Status: SHIPPED | OUTPATIENT
Start: 2022-05-04 | End: 2022-06-01

## 2022-05-04 RX ORDER — BLOOD-GLUCOSE METER
1 KIT MISCELLANEOUS DAILY
Qty: 1 KIT | Refills: 0 | Status: SHIPPED | OUTPATIENT
Start: 2022-05-04

## 2022-05-04 RX ORDER — ROSUVASTATIN CALCIUM 5 MG/1
5 TABLET, COATED ORAL NIGHTLY
Qty: 90 TABLET | Refills: 1 | Status: SHIPPED | OUTPATIENT
Start: 2022-05-04

## 2022-05-04 RX ORDER — GLUCOSAMINE HCL/CHONDROITIN SU 500-400 MG
CAPSULE ORAL
Qty: 100 STRIP | Refills: 3 | Status: SHIPPED | OUTPATIENT
Start: 2022-05-04

## 2022-05-04 RX ORDER — LANCETS 30 GAUGE
1 EACH MISCELLANEOUS DAILY
Qty: 100 EACH | Refills: 3 | Status: SHIPPED | OUTPATIENT
Start: 2022-05-04

## 2022-05-04 RX ORDER — FUROSEMIDE 20 MG/1
20 TABLET ORAL DAILY
Qty: 90 TABLET | Refills: 1 | Status: SHIPPED | OUTPATIENT
Start: 2022-05-04

## 2022-05-04 RX ORDER — TRAZODONE HYDROCHLORIDE 50 MG/1
50 TABLET ORAL NIGHTLY PRN
Qty: 30 TABLET | Refills: 0 | Status: SHIPPED | OUTPATIENT
Start: 2022-05-04 | End: 2022-06-01 | Stop reason: SDUPTHER

## 2022-05-04 ASSESSMENT — PATIENT HEALTH QUESTIONNAIRE - PHQ9
SUM OF ALL RESPONSES TO PHQ QUESTIONS 1-9: 0
SUM OF ALL RESPONSES TO PHQ9 QUESTIONS 1 & 2: 0
SUM OF ALL RESPONSES TO PHQ QUESTIONS 1-9: 0
SUM OF ALL RESPONSES TO PHQ QUESTIONS 1-9: 0
1. LITTLE INTEREST OR PLEASURE IN DOING THINGS: 0
SUM OF ALL RESPONSES TO PHQ QUESTIONS 1-9: 0
2. FEELING DOWN, DEPRESSED OR HOPELESS: 0

## 2022-05-04 NOTE — PROGRESS NOTES
5/4/2022    Chief Complaint   Patient presents with   [facility] on 4/13 for tachycardia and dizziness         PLAN:       David Elmore was seen today for tachycardia. Diagnoses and all orders for this visit:    Insomnia, unspecified type    Type 2 diabetes mellitus without complication, without long-term current use of insulin ([facility] [address]  -     Ambulatory Referral To Diabetes Education    Sleep apnea, unspecified type  -     Ambulatory referral to Sleep Medicine    Other orders  -     Discontinue: rosuvastatin (CRESTOR) 5 MG tablet; Take 1 tablet by mouth nightly  -     rosuvastatin (CRESTOR) 5 MG tablet; Take 1 tablet by mouth nightly  -     traZODone (DESYREL) 50 MG tablet; Take 1 tablet by mouth nightly as needed for Sleep  -     metFORMIN (GLUCOPHAGE-XR) 500 MG extended release tablet; Take one pill  Twice a day with food  -     glucose monitoring (FREESTYLE FREEDOM) kit; 1 kit by Does not apply route daily  -     blood glucose monitor strips; Test   BID or as needed for high or low glucose  -     blood glucose monitor strips; Test 2  times a day & as needed for symptoms  -     Lancets MISC; 1 each by Does not apply route daily Test BID  -     furosemide (LASIX) 20 MG tablet; Take 1 tablet by mouth daily      Pt is newly dx dm2 -discussed today . He did not know this  Gave him diabetic ed books  Supplies  Start metformin    Start crestor for [employer] trazodone for sleep  Follow up 2-3 weeks with wife to discuss diabetes  Needed new pulm doctor  For cpap  He is follow up with cardiology for his heart  Does not sound like afib was diagnosed  Perhaps his antihistamine affected his heart rate?       \[facility] [place] [facility]\""  Hospital follow up   Heart rate  170-190 came to ER .       Admit Date: 4/13/2022      Discharge Date:   04/14/22      Admit Date: 4/13/2022      Discharge Date:   04/14/22   per discharge summary  Possible TIA/CVA  - with symptoms: dizziness/palpitations and coordination issues  - admit to [facility] to [facility] TIA/CVA  - head CT neg for acute pathology  - CTA head/neck: Moderate to severe stenosis of dominant left vertebral artery at C5-C6.  No large vessel intracranial occlusion or high-grade stenosis. Echo unremarkable, no diastolic dysfunction noted on this echo. MRI unremarkable. Patient was seen and cleared by neurology    Palpitations. We will attach event monitor on discharge    Pt said he might have had afib. Was taking zyrtec daily and it was thought it may have made his heart race. 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   He is a 7th day Jain  Eats some meat        Lab Results   Component Value Date    LABA1C 7.3 04/14/2022    LABA1C 6.1 09/21/2021     Review of Systems   Cardiovascular:        Lili guzman   Wears a watch  Normally in the 80's       Health Maintenance   Topic Date Due    HIV screen  Never done    Hepatitis C screen  Never done    Shingles vaccine (3 of 3) 11/27/2020    Pneumococcal 0-64 years Vaccine (2 - PCV) 10/08/2021    A1C test (Diabetic or Prediabetic)  07/14/2022    Depression Screen  09/21/2022    Potassium  04/14/2023    Creatinine  04/14/2023    DTaP/Tdap/Td vaccine (2 - Td or Tdap) 03/11/2025    Lipids  04/14/2027    Colorectal Cancer Screen  01/28/2032    Flu vaccine  Completed    COVID-19 Vaccine  Completed    Hepatitis A vaccine  Aged Out    Hepatitis B vaccine  Aged Out    Hib vaccine  Aged Out    Meningococcal (ACWY) vaccine  Aged Out      Social History     Tobacco Use    Smoking status: Former Smoker     Packs/day: 0.25     Years: 30.00     Pack years: 7.50     Types: Cigarettes     Start date: 3/11/1985     Quit date: 11/26/2015 Years since quittin.4    Smokeless tobacco: Never Used   Vaping Use    Vaping Use: Never used   Substance Use Topics    Alcohol use: No    Drug use: No      Family History   Problem Relation Age of Onset    Other Father         CHF    Cancer Father         prostate    Asthma Other     Heart Disease Other     High Blood Pressure Other     Cancer Brother         leukemia    Heart Attack Brother      Prior to Visit Medications    Medication Sig Taking?  Authorizing Provider   aspirin 81 MG EC tablet Take 81 mg by mouth daily Yes Historical Provider, MD   furosemide (LASIX) 20 MG tablet Take 1 tablet by mouth daily Yes Shaun Thomas MD   omeprazole (PRILOSEC) 40 MG delayed release capsule Take 40 mg by mouth 2 times daily  Yes Historical Provider, MD   budesonide-formoterol (SYMBICORT) 80-4.5 MCG/ACT AERO Inhale 2 puffs into the lungs 2 times daily  Patient not taking: Reported on 2022  Prema Plummer MD   ibuprofen (ADVIL;MOTRIN) 200 MG tablet Take 800 mg by mouth every 6 hours as needed   Patient not taking: Reported on 2022  Historical Provider, Amada Fuller, (BL SAW PALMETTO PO) Take by mouth  Historical Provider, MD   albuterol sulfate  (90 Base) MCG/ACT inhaler INHALE 2 PUFFS BY MOUTH EVERY 6 HOURS AS NEEDED  Patient not taking: Reported on 2022  Historical Provider, MD   acetaminophen (TYLENOL) 650 MG CR tablet Take 650 mg by mouth every 8 hours as needed for Pain  Historical Provider, MD     Patient Active Problem List   Diagnosis    Diverticulitis of intestine with perforation    AVN (avascular necrosis of bone) ([address]    H/O total hip arthroplasty    Leukocytosis    Pneumoperitoneum from diverticulitis    Diverticulitis of colon    Rotator cuff (capsule) sprain    Other pneumothorax  twice in his 20's ( was thin)    CPAP (continuous positive airway pressure) dependence    Exercise intolerance    Orthostatic hypotension    Asthma  Chest tightness    SOB (shortness of breath)    Family history of leukemia brother    History of leukocytosis    Dizziness        LABS:     Lab Results   Component Value Date    LABA1C 7.3 04/14/2022    LABA1C 6.1 09/21/2021    LABMICR YES 05/26/2016    LABMICR YES 09/10/2015    LABMICR YES 07/17/2014       Lab Results   Component Value Date     04/14/2022     04/13/2022     09/30/2021    K 4.1 04/14/2022    K 3.7 04/13/2022    K 4.8 09/30/2021     04/14/2022     04/13/2022     09/30/2021    CO2 20 (L) 04/14/2022    CO2 24 04/13/2022    CO2 24 09/30/2021    BUN 16 04/14/2022    BUN 20 04/13/2022    BUN 13 09/30/2021    CREATININE 0.9 04/14/2022    CREATININE 1.0 04/13/2022    CREATININE 1.1 09/30/2021    GLUCOSE 167 (H) 04/14/2022    GLUCOSE 261 (H) 04/13/2022    GLUCOSE 72 09/30/2021    CALCIUM 8.9 04/14/2022    CALCIUM 9.3 04/13/2022    CALCIUM 9.6 09/30/2021       Lab Results   Component Value Date    CHOL 204 (H) 04/14/2022    CHOL 228 (H) 10/07/2021    TRIG 134 04/14/2022    TRIG 137 10/07/2021    HDL 42 04/14/2022    HDL 57 10/07/2021    LDLCALC 135 (H) 04/14/2022    LDLCALC 144 (H) 10/07/2021       Lab Results   Component Value Date    ALT 23 09/21/2021    ALT 14 09/10/2015    ALT 17 07/25/2014    AST 20 09/21/2021    AST 13 (L) 09/10/2015    AST 18 07/25/2014       No results found for: TSH, T4FREE, T3FREE    Lab Results   Component Value Date    WBC 11.0 04/14/2022    WBC 12.3 (H) 04/13/2022    WBC 10.8 09/21/2021    HGB 13.2 (L) 04/14/2022    HGB 13.9 04/13/2022    HGB 11.8 (L) 09/21/2021    HCT 39.5 (L) 04/14/2022    HCT 42.2 04/13/2022    HCT 37.0 (L) 09/21/2021    MCV 90.6 04/14/2022    MCV 90.5 04/13/2022    MCV 76.0 (L) 09/21/2021     04/14/2022     04/13/2022     09/21/2021       Lab Results   Component Value Date    INR 1.42 (H) 07/25/2014        No results found for: PSA     No results found for: LABURIC      No results found for: PSA, PSADIA     PHYSICAL EXAM:  /84 (Site: Left Upper Arm, Position: Sitting, Cuff Size: Large Adult)   Pulse 104   Resp 14   Wt (!) 301 lb (136.5 kg)   SpO2 98%   BMI 38.65 kg/m²    Physical Exam  Constitutional:       Appearance: Normal appearance. He is well-developed. He is obese. HENT:      Head: Normocephalic and atraumatic. Cardiovascular:      Rate and Rhythm: Normal rate and regular rhythm. Heart sounds: No murmur heard. Pulmonary:      Effort: Pulmonary effort is normal.      Breath sounds: Normal breath sounds. No wheezing. Skin:     General: Skin is warm and dry. Neurological:      Mental Status: He is alert. Psychiatric:         Mood and Affect: Mood normal.         Behavior: Behavior normal.         Thought Content:  Thought content normal.         Judgment: Judgment normal.       BP Readings from Last 5 Encounters:   05/04/22 128/84   04/14/22 (!) 142/95   02/11/22 120/76   01/28/22 114/74   01/28/22 (!) 133/99       Wt Readings from Last 5 Encounters:   05/04/22 (!) 301 lb (136.5 kg)   04/14/22 (!) 300 lb 7.8 oz (136.3 kg)   02/11/22 294 lb (133.4 kg)   01/28/22 282 lb (127.9 kg)   10/06/21 282 lb (127.9 kg)

## 2022-05-19 ENCOUNTER — OFFICE VISIT (OUTPATIENT)
Dept: SLEEP MEDICINE | Age: 57
End: 2022-05-19
Payer: COMMERCIAL

## 2022-05-19 VITALS
RESPIRATION RATE: 18 BRPM | BODY MASS INDEX: 38.53 KG/M2 | TEMPERATURE: 98.7 F | SYSTOLIC BLOOD PRESSURE: 110 MMHG | WEIGHT: 300.2 LBS | OXYGEN SATURATION: 98 % | HEIGHT: 74 IN | HEART RATE: 105 BPM | DIASTOLIC BLOOD PRESSURE: 80 MMHG

## 2022-05-19 DIAGNOSIS — Z99.89 OSA ON CPAP: Primary | ICD-10-CM

## 2022-05-19 DIAGNOSIS — Z99.89 CPAP (CONTINUOUS POSITIVE AIRWAY PRESSURE) DEPENDENCE: ICD-10-CM

## 2022-05-19 DIAGNOSIS — G47.33 OSA ON CPAP: Primary | ICD-10-CM

## 2022-05-19 DIAGNOSIS — E66.01 CLASS 2 SEVERE OBESITY DUE TO EXCESS CALORIES WITH SERIOUS COMORBIDITY AND BODY MASS INDEX (BMI) OF 38.0 TO 38.9 IN ADULT (HCC): ICD-10-CM

## 2022-05-19 PROCEDURE — 3017F COLORECTAL CA SCREEN DOC REV: CPT | Performed by: PSYCHIATRY & NEUROLOGY

## 2022-05-19 PROCEDURE — G8417 CALC BMI ABV UP PARAM F/U: HCPCS | Performed by: PSYCHIATRY & NEUROLOGY

## 2022-05-19 PROCEDURE — 99203 OFFICE O/P NEW LOW 30 MIN: CPT | Performed by: PSYCHIATRY & NEUROLOGY

## 2022-05-19 PROCEDURE — G8427 DOCREV CUR MEDS BY ELIG CLIN: HCPCS | Performed by: PSYCHIATRY & NEUROLOGY

## 2022-05-19 PROCEDURE — 1036F TOBACCO NON-USER: CPT | Performed by: PSYCHIATRY & NEUROLOGY

## 2022-05-19 PROCEDURE — 93272 ECG/REVIEW INTERPRET ONLY: CPT | Performed by: INTERNAL MEDICINE

## 2022-05-19 ASSESSMENT — SLEEP AND FATIGUE QUESTIONNAIRES
HOW LIKELY ARE YOU TO NOD OFF OR FALL ASLEEP WHILE LYING DOWN TO REST IN THE AFTERNOON WHEN CIRCUMSTANCES PERMIT: 1
NECK CIRCUMFERENCE (INCHES): 18
HOW LIKELY ARE YOU TO NOD OFF OR FALL ASLEEP WHILE SITTING INACTIVE IN A PUBLIC PLACE: 1
HOW LIKELY ARE YOU TO NOD OFF OR FALL ASLEEP WHILE SITTING AND READING: 2
HOW LIKELY ARE YOU TO NOD OFF OR FALL ASLEEP WHEN YOU ARE A PASSENGER IN A CAR FOR AN HOUR WITHOUT A BREAK: 0
HOW LIKELY ARE YOU TO NOD OFF OR FALL ASLEEP WHILE WATCHING TV: 2
HOW LIKELY ARE YOU TO NOD OFF OR FALL ASLEEP IN A CAR, WHILE STOPPED FOR A FEW MINUTES IN TRAFFIC: 1
ESS TOTAL SCORE: 8
HOW LIKELY ARE YOU TO NOD OFF OR FALL ASLEEP WHILE SITTING QUIETLY AFTER LUNCH WITHOUT ALCOHOL: 1
HOW LIKELY ARE YOU TO NOD OFF OR FALL ASLEEP WHILE SITTING AND TALKING TO SOMEONE: 0

## 2022-05-19 ASSESSMENT — ENCOUNTER SYMPTOMS
SORE THROAT: 1
EYES NEGATIVE: 1
SHORTNESS OF BREATH: 1
ALLERGIC/IMMUNOLOGIC NEGATIVE: 1
GASTROINTESTINAL NEGATIVE: 1

## 2022-05-19 NOTE — PROGRESS NOTES
MD LANDRY Talavera Board Certified in Sleep Medicine  Certified [facility] Sleep Medicine  Board Certified in Neurology [address]  [address] [facility] [address] [facility], [address]  [address], [address]           [address] Located within [facility] SLEEP MEDICINE [address] [id number] [phone]    Subjective:     Patient ID: Naz Orozco is a 56 y.o. male. Chief Complaint   Patient presents with    [facility]    Sleep Apnea       HPI:        Naz Orozco is a 56 y.o. male referred by Dr. Evin Abreu for a sleep evaluation. Patient  was diagnosed with severe obstructive sleep apnea about 5 years ago, currently of PAP machine at 7 CMWP, last sleep study was 7 years ago, last PAP titration about 7 years ago. Patient is using the PAP machine  in total average of 6 hours a night, more than 4 hours a night about 100 % according to the patient . This patient has gained about 42 pounds since last PAP titration, weight was 258 pounds. He complains of tossing and turning, feels sleepy during the day, take naps during the day but he denies knees buckling with laughing, completely or partially paralyzed while falling asleep or waking up, noisy environment, uncomfortable room temperature, uncomfortable bedding. Symptoms began several years ago, unchanged since that time. The patient's bed-partner confirmed the snoring and stopped breathing at night  SLEEP SCHEDULE: Goes to bed around 9 PM in the weekdays and 10 PM in the weekends. It usually takes the patient 60 minutes to fall asleep. The patient gets up 3-5 per night to go to the bathroom. The Patient finally gets up at 6:30 AM during the weekdays and 7 AM in the weekends. patient wakes up with dry mouth and sometimes morning headache. . the headache usually dull headache . Rai Hilt The patient has restless sleep with frequent arousals in addition to the Patient has significant daytime sleepiness. The Patient scored Total score: 8 on [place] Sleepiness Scale ( more than 10 is indicative of daytime sleepiness)and 56 in fatigue scale ( more than 36 is indicative of daytime fatigue). The patient takes one nap/week for 60 minutes and usually is not refreshing nap. Previous evaluation and treatment has included- split night PSG. Had study SPLIT done on 2017which showed an AHI - 87.4/hr with Low SaO2 - 77% and time below 90% of 53.2 min. This is consistent with severe MARIA ISABEL (327.23), The titration was up to 8 cm. DOT/CDL - N/A  FAA/'slicense - N/A  The patient HTN is stable. Previous Report(s) Reviewed: historical medical records       Social History     Socioeconomic History    Marital status:      Spouse name: Not on file    Number of children: Not on file    Years of education: Not on file    Highest education level: Not on file   Occupational History    Occupation: NA   Tobacco Use    Smoking status: Former Smoker     Packs/day: 0.25     Years: 30.00     Pack years: 7.50     Types: Cigarettes     Start date: 3/11/1985     Quit date: 2015     Years since quittin.4    Smokeless tobacco: Never Used   Vaping Use    Vaping Use: Never used   Substance and Sexual Activity    Alcohol use: No    Drug use: No    Sexual activity: Yes     Partners: Female   Other Topics Concern    Not on file   Social History Narrative    Not on file     Social Determinants of Health     Financial Resource Strain:     Difficulty of Paying Living Expenses: Not on file   Food Insecurity:     Worried About [address] in the Last Year: Not on file    Miya of Food in the Last Year: Not on file   Transportation Needs:     Lack of Transportation (Medical): Not on file    Lack of Transportation (Non-Medical):  Not on file   Physical Activity:     Days of Exercise per Week: Not on file    Minutes of Exercise per Session: Not on file   Stress:     Feeling of Stress : Not on file   Social Connections:     Frequency of Communication with Friends and Family: Not on file    Frequency of Social Gatherings with Friends and Family: Not on file    Attends Bahai Services: Not on file    Active Member of [address] [place] [address] [employer] or Organizations: Not on file    Attends Club or Organization Meetings: Not on file    Marital Status: Not on file   Intimate Partner Violence:     Fear of Current or Ex-Partner: Not on file    Emotionally Abused: Not on file    Physically Abused: Not on file    Sexually Abused: Not on file   Housing Stability:     Unable to Pay for Housing in the Last Year: Not on file    Number of [place] in the Last Year: Not on file    Unstable Housing in the Last Year: Not on file       Prior to Admission medications    Medication Sig Start Date End Date Taking?  Authorizing Provider   rosuvastatin (CRESTOR) 5 MG tablet Take 1 tablet by mouth nightly 5/4/22  Yes Brianda Sharpe MD   traZODone (DESYREL) 50 MG tablet Take 1 tablet by mouth nightly as needed for Sleep 5/4/22  Yes Brianda Sharpe MD   metFORMIN (GLUCOPHAGE-XR) 500 MG extended release tablet Take one pill  Twice a day with food 5/4/22  Yes Brianda Sharpe MD   glucose monitoring (FREESTYLE FREEDOM) kit 1 kit by Does not apply route daily 5/4/22  Yes Brianda Sharpe MD   blood glucose monitor strips Test   BID or as needed for high or low glucose 5/4/22  Yes Brianda Sharpe MD   blood glucose monitor strips Test 2  times a day & as needed for symptoms 5/4/22  Yes Brianda Sharpe MD   Lancets MISC 1 each by Does not apply route daily Test BID 5/4/22  Yes Brianda Sharpe MD   furosemide (LASIX) 20 MG tablet Take 1 tablet by mouth daily 5/4/22  Yes Brianda Sharpe MD   ibuprofen (ADVIL;MOTRIN) 200 MG tablet Take 800 mg by mouth every 6 hours as needed    Yes Historical Provider, MD   aspirin 81 MG EC tablet Take 81 mg by mouth daily   Yes Historical Provider, MD   albuterol sulfate  (90 Base) MCG/ACT inhaler INHALE 2 PUFFS BY MOUTH EVERY 6 HOURS AS NEEDED 8/10/21  Yes Historical Provider, MD   omeprazole (PRILOSEC) 40 MG delayed release capsule Take 40 mg by mouth 2 times daily    Yes Historical Provider, MD       Allergies as of 05/19/2022    (No Known Allergies)       Patient Active Problem List   Diagnosis    Diverticulitis of intestine with perforation    AVN (avascular necrosis of bone) ([address]    H/O total hip arthroplasty    Leukocytosis    Pneumoperitoneum from diverticulitis    Diverticulitis of colon    Rotator cuff (capsule) sprain    Other pneumothorax  twice in his 20's ( was thin)    CPAP (continuous positive airway pressure) dependence    Exercise intolerance    Orthostatic hypotension    Asthma    Chest tightness    SOB (shortness of breath)    Family history of leukemia brother    History of leukocytosis    Dizziness       Past Medical History:   Diagnosis Date    Anxiety     Anxiety 2005    Asthma 2019    exercise-induced asthma    Bursitis 2016    both shoulders    Depression     Depression 2019    Diverticula of colon     Diverticulitis 2019    of colon    GERD (gastroesophageal reflux disease)     Orthostatic hypotension 05/18/2021    Reflux esophagitis 2005    Sleep apnea     cpap       Past Surgical History:   Procedure Laterality Date    COLON SURGERY      COLONOSCOPY      COLONOSCOPY N/A 1/28/2022    COLONOSCOPY POLYPECTOMY SNARE/COLD BIOPSY performed by Osei Ferraro MD at [facility]  2014    closed    HIP SURGERY Bilateral L 2006, R 2007    Total Arthroplasty    JOINT REPLACEMENT  12/11/2019    hx bilateral hip replacements    LAPAROTOMY  7-17-14    EXPLORATORY LAPAROTOMY, BOWEL RESECTION, COLOSTOMY    LUNG SURGERY      OTHER SURGICAL HISTORY  7/24/14    OTHER SURGICAL HISTORY 10/5/15    Excision of sutures from abdominal wound    REVISION COLOSTOMY  11/19/14    Reversal    ROTATOR CUFF REPAIR  2019    THORACENTESIS      insert chest tube-age 25    UPPER GASTROINTESTINAL ENDOSCOPY N/A 1/28/2022    EGD BIOPSY performed by Jonathan Morataya MD at [facility] History   Problem Relation Age of Onset    Other Father         CHF    Cancer Father         prostate    Asthma Other     Heart Disease Other     High Blood Pressure Other     Cancer Brother         leukemia    Heart Attack Brother        Review of Systems   Constitutional: Negative. HENT: Positive for congestion and sore throat. Eyes: Negative. Respiratory: Positive for shortness of breath. Cardiovascular: Negative. Gastrointestinal: Negative. Endocrine: Positive for heat intolerance. Genitourinary: Positive for frequency. Musculoskeletal: Positive for arthralgias. Allergic/Immunologic: Negative. Neurological: Positive for headaches. Hematological: Negative. Psychiatric/Behavioral: Positive for agitation. Objective:     Vitals:  Weight BMI Neck circumference    Wt Readings from Last 3 Encounters:   05/19/22 (!) 300 lb 3.2 oz (136.2 kg)   05/04/22 (!) 301 lb (136.5 kg)   04/14/22 (!) 300 lb 7.8 oz (136.3 kg)    Body mass index is 38.54 kg/m². Neck circumference (Inches): 18     BP HR SaO2   BP Readings from Last 3 Encounters:   05/19/22 110/80   05/04/22 128/84   04/14/22 (!) 142/95    Pulse Readings from Last 3 Encounters:   05/19/22 105   05/04/22 104   04/14/22 97    SpO2 Readings from Last 3 Encounters:   05/19/22 98%   05/04/22 98%   04/14/22 94%        The mandibular molar Class :   [x]1 []2 []3      Mallampati I Airway Classification:   []1 []2 []3 [x]4        Physical Exam  Vitals and nursing note reviewed. Constitutional:       Appearance: Normal appearance. HENT:      Head: Atraumatic.       Nose: Nose normal.   Eyes:      Extraocular Movements: Extraocular movements intact. Comments: Mallampati class 4, no retrognathia or hypognathia , normal airflow in bilateral nostrils, no septum deviation , crowded oropharynx with low soft palate, no tonsils enlargement. Cardiovascular:      Rate and Rhythm: Normal rate and regular rhythm. Pulmonary:      Effort: Pulmonary effort is normal.      Breath sounds: Normal breath sounds. Musculoskeletal:         General: Normal range of motion. Cervical back: Normal range of motion and neck supple. Skin:     General: Skin is warm. Neurological:      General: No focal deficit present. Psychiatric:         Mood and Affect: Mood normal.         Assessment:   Severe Obstructive Sleep Apnea/Hypopnea Syndrome, had gained 42 pounds and his symptoms has relapsed     Diagnosis Orders   1. MARIA ISABEL on CPAP     2. CPAP (continuous positive airway pressure) dependence     3. Class 2 severe obesity due to excess calories with serious comorbidity and body mass index (BMI) of 38.0 to 38.9 in adult [facility])  Ambulatory Referral to Bariatric Surgery     Plan:   New APAP 8 and 16 cm, nasal pillow mask. Patient was counseled about the pathophysiology of obstructive sleep apnea syndrome and the methods for evaluating its presence and severity. Patient was counseled to avoid driving and other potentially hazardous circumstances if the patient is experiencing excessive sleepiness. Treatment considerations include the use of nasal CPAP,In the meantime, the patient should be cautioned to avoid the use of alcohol or other depressant medications because of potential for increasing the duration and severity of apnea and cautioned regarding driving or operating and dangerous equipment if the patient is experiencing daytime sleepiness. .  Weight loss: We discussed the proportionality between weight and AHI. With 10% weight change, the AHI has a 27% proportionate change. With 20% weight change, the AHI has a 45-50% proportionate change. Orders Placed This Encounter   Procedures    Ambulatory Referral to Bariatric Surgery       Return for F/U between 31 and 90 days from the recieving CPAP.     Miladis Cavanaugh MD  Medical Director [facility]

## 2022-05-19 NOTE — PATIENT INSTRUCTIONS
Orders Placed This Encounter   Procedures    Ambulatory Referral to Bariatric Surgery     Referral Priority:   Routine     Referral Type:   Eval and Treat     Referral Reason:   Specialty Services Required     Referred to Provider:   Valeri Scott MD     Requested Specialty:   Bariatric Surgery     Number of Visits Requested:   1        Patient Education        Learning About CPAP for Sleep Apnea  What is CPAP? CPAP is a small machine that you use at home every night while you sleep. It increases air pressure in your throat to keep your airway open. When you have sleep apnea, this can help you sleep better, feel better, and avoid futurehealth problems. CPAP stands for \"continuous positive airway pressure. \"  The CPAP machine will have one of the following:   A mask that covers your nose and mouth   A mask that covers your nose only   A nasal pillow that covers only the openings of your nose  Why is it done? CPAP is usually the best treatment for obstructive sleep apnea. It is the firsttreatment choice and the most widely used. CPAP:   Helps you have more normal sleep, so you feel less sleepy and more alert during the daytime.  May help keep heart failure or other heart problems from getting worse.  May help lower your blood pressure. If you have a bed partner, they may also sleep better when you use a CPAP. That's because you aren't snoring or restless. Your doctor may suggest CPAP if you have:   Moderate to severe sleep apnea.  Sleep apnea and coronary artery disease (CAD).  Sleep apnea and heart failure. What are the side effects? Some people who use CPAP have:   A dry or stuffy nose and a sore throat.  Irritated skin on the face.  Bloating. How can you care for yourself? If using CPAP is not comfortable, or if you have certain side effects, workwith your doctor to fix them. Here are some things you can try:   Be sure the mask, nasal mask, or nasal pillow fits well.    See if your doctor can adjust the pressure of your CPAP.  If your nose or mouth is dry, set the machine to deliver warmer or wetter air. Or try using a humidifier.  If your nose is runny or stuffy, talk to your doctor about using a decongestant medicine or steroid nasal spray. Be safe with medicines. Read and follow all instructions on the label. Do not use the medicine longer than the label says.  Your doctor may also help you with problems like swallowing air, bloating, or claustrophobia. Talk to your doctor if you're still having problems. If these things don'thelp, you might try a different type of machine. Where can you learn more? Go to https://[employer]. org and sign in to your [employer] account. Enter K253 in the [employer] box to learn more about \"Learning About CPAP for Sleep Apnea. \"     If you do not have an account, please click on the \"Sign Up Now\" link. Current as of: July 6, 2021               Content Version: 13.2  © 2006-2022 Healthwise, Incorporated. Care instructions adapted under license by [facility] ([facility]). If you have questions about a medical condition or this instruction, always ask your healthcare professional. [place] [address] any warranty or liability for your use of this information.

## 2022-05-19 NOTE — PROGRESS NOTES
Freddy Merino         : 1965  [] [facility]     [] [facility]      [x] Lita     []Silvia    [] Gretchen Clifton  [] [facility]   [] Other:  Diagnosis: [x] MARIA ISABEL (G47.33) [] CSA (G47.31) [] Apnea (G47.30)   Length of Need: [] 12 Months [x] 99 Months [] Other:    Machine (CHEY!): [x] Alysia [x] ResMed AirSense     Auto [] Other:     [x]  CPAP () [] Bilevel ()   Mode: [x] Auto [] Spontaneous    Mode: [] Auto [] Spontaneous           Between 8 and 16 cm                 Comfort Settings:   - Ramp Pressure: 5 cmH2O                                        - Ramp time: 15 min                                     -  Flex/EPR - 3 full time                                    - For ResMed Bilevel (TiMax-4 sec   TiMin- 0.2 sec)     Humidifier: [x] Heated ()        [x] Water chamber replacement ()/ 1 per 6 months        Mask:  Please always start with the mask the patient used during the titraion   [x] Nasal () /1 per 3 months [] Full Face () /1 per 3 months   [x] Patient choice -Size and fit mask [] Patient Choice - Size and fit mask   [] Dispense: nasal pillow [] Dispense:    [x] Headgear () / 1 per 3 months [] Headgear () / 1 per 3 months   [x] Replacement Nasal Cushion ()/2 per month [] Interface Replacement ()/1 per month   [x] Replacement Nasal Pillows ()/2 per month         Tubing: [x] Heated ()/1 per 3 months    [] Standard ()/1 per 3 months [] Other:           Filters: [x] Non-disposable ()/1 per 6 months     [x] Ultra-Fine, Disposable ()/2 per month        Miscellaneous: [x] Chin Strap ()/ 1 per 6 months [] O2 bleed-in:       LPM   [] Oximetry on CPAP/Bilevel []  Other:          Start Order Date: 22    MEDICAL JUSTIFICATION:  I, the undersigned, certify that the above prescribed supplies are medically necessary for this patients wellbeing.   In my opinion, the supplies are both reasonable and necessary in reference to accepted standards of medicalpractice in treatment of this patients condition.     Toyin Claros MD      NPI: [id number]       Order Signed Date: 05/19/22    Electronically signed by Toyin Claros MD on 5/19/2022 at 3:18 PM

## 2022-05-20 ENCOUNTER — TELEPHONE (OUTPATIENT)
Dept: CARDIOLOGY CLINIC | Age: 57
End: 2022-05-20

## 2022-05-20 ENCOUNTER — TELEPHONE (OUTPATIENT)
Dept: PULMONOLOGY | Age: 57
End: 2022-05-20

## 2022-05-20 DIAGNOSIS — R00.2 PALPITATIONS: ICD-10-CM

## 2022-05-20 NOTE — TELEPHONE ENCOUNTER
Patient called the office today stating [facility] does not take his secondary insurance and wants his orders sent to [facility]. I sent the orders to [facility] as of today.

## 2022-05-31 ENCOUNTER — TELEPHONE (OUTPATIENT)
Dept: PULMONOLOGY | Age: 57
End: 2022-05-31

## 2022-05-31 NOTE — TELEPHONE ENCOUNTER
Khris Zaragoza calls. He would like to stop by today to have Dr. Martita Bustamante adjust pressures.  Will be here between 2:45-3:00pm

## 2022-06-01 ENCOUNTER — OFFICE VISIT (OUTPATIENT)
Dept: FAMILY MEDICINE CLINIC | Age: 57
End: 2022-06-01
Payer: COMMERCIAL

## 2022-06-01 VITALS
WEIGHT: 302 LBS | OXYGEN SATURATION: 98 % | HEART RATE: 84 BPM | RESPIRATION RATE: 12 BRPM | DIASTOLIC BLOOD PRESSURE: 80 MMHG | BODY MASS INDEX: 38.77 KG/M2 | SYSTOLIC BLOOD PRESSURE: 126 MMHG

## 2022-06-01 DIAGNOSIS — I95.1 ORTHOSTATIC HYPOTENSION: ICD-10-CM

## 2022-06-01 DIAGNOSIS — E11.9 TYPE 2 DIABETES MELLITUS WITHOUT COMPLICATION, WITHOUT LONG-TERM CURRENT USE OF INSULIN (HCC): Primary | ICD-10-CM

## 2022-06-01 PROCEDURE — G8417 CALC BMI ABV UP PARAM F/U: HCPCS | Performed by: INTERNAL MEDICINE

## 2022-06-01 PROCEDURE — 3017F COLORECTAL CA SCREEN DOC REV: CPT | Performed by: INTERNAL MEDICINE

## 2022-06-01 PROCEDURE — 1036F TOBACCO NON-USER: CPT | Performed by: INTERNAL MEDICINE

## 2022-06-01 PROCEDURE — 2022F DILAT RTA XM EVC RTNOPTHY: CPT | Performed by: INTERNAL MEDICINE

## 2022-06-01 PROCEDURE — 3051F HG A1C>EQUAL 7.0%<8.0%: CPT | Performed by: INTERNAL MEDICINE

## 2022-06-01 PROCEDURE — 99213 OFFICE O/P EST LOW 20 MIN: CPT | Performed by: INTERNAL MEDICINE

## 2022-06-01 PROCEDURE — G8427 DOCREV CUR MEDS BY ELIG CLIN: HCPCS | Performed by: INTERNAL MEDICINE

## 2022-06-01 RX ORDER — ORAL SEMAGLUTIDE 3 MG/1
TABLET ORAL
Qty: 30 TABLET | Refills: 0 | COMMUNITY
Start: 2022-06-01 | End: 2022-08-10 | Stop reason: ALTCHOICE

## 2022-06-01 RX ORDER — TRAZODONE HYDROCHLORIDE 50 MG/1
50 TABLET ORAL NIGHTLY PRN
Qty: 90 TABLET | Refills: 1 | Status: SHIPPED | OUTPATIENT
Start: 2022-06-01

## 2022-06-01 ASSESSMENT — ENCOUNTER SYMPTOMS
DIARRHEA: 0
CONSTIPATION: 0

## 2022-06-01 NOTE — PROGRESS NOTES
Radha Pierer (:  1965) is a 56 y.o. male, here for evaluation of the following chief complaint(s):  Diabetes         ASSESSMENT/PLAN:    Wiliam Saavedra was seen today for diabetes. Diagnoses and all orders for this visit:    Type 2 diabetes mellitus without complication, without long-term current use of insulin ([facility] [address]  -     Ambulatory Referral To Diabetes Education    Orthostatic hypotension    Other orders  -     traZODone (DESYREL) 50 MG tablet; Take 1 tablet by mouth nightly as needed for Sleep  -     Semaglutide (RYBELSUS) 3 MG TABS; Take one pill  30 min before eating with 4 oz of water  -     Semaglutide 7 MG TABS; Take one pill daily    gave pt diabetic books  Needs to look at the carb book at home  Stop metformin due to gi cramping  Start rybelsus 3 mg  Sees cardiology for orthostatic hypotension and shortness of breath  SUBJECTIVE/OBJECTIVE:  HPI  Has orthostatic hypotension   Only fallen once  Before hospitalization  Wore the heart monitor   No events in  30 days  Pulse went up few times  Will be follow up with Courtney      DM  Started eating worse  Currently improving it  125-145  At home  Does not eat regularly  Has metformin  Has cramping for  2 hours after taking it  No wt loss    Has been working on exercise  Sounds like he is improving     Asked for handicap sticker  We discussed  For now hold of but will consider later  His stamina may be improving    Lab Results   Component Value Date    LABA1C 7.3 2022    LABA1C 6.1 2021       Review of Systems   Constitutional: Positive for appetite change and unexpected weight change. Gastrointestinal: Negative for constipation and diarrhea. Objective   Physical Exam  Constitutional:       Appearance: Normal appearance. He is obese. HENT:      Head: Normocephalic and atraumatic. Neurological:      Mental Status: He is alert.    Psychiatric:         Mood and Affect: Mood normal.         Behavior: Behavior normal. Thought Content:  Thought content normal.         Judgment: Judgment normal.            Marcela Jackson MD

## 2022-06-01 NOTE — LETTER
[address] [facility] [address]  Phone: [phone]  Fax: [phone]    Laura Whalen MD        June 1, 2022     Patient: Jo Ann Beyer   YOB: 1965   Date of Visit: 6/1/2022       To Whom it May Concern:    Aditya Trujillo was seen in my clinic on 6/1/2022. If you have any questions or concerns, please don't hesitate to call.     Sincerely,         Laura Whalen MD

## 2022-06-01 NOTE — PATIENT INSTRUCTIONS
There is a great carbohydrate book at this website  [email or web address]-[email or web address]-[email or web address]=[email or web address]

## 2022-07-13 ENCOUNTER — TELEPHONE (OUTPATIENT)
Dept: FAMILY MEDICINE CLINIC | Age: 57
End: 2022-07-13

## 2022-07-13 NOTE — TELEPHONE ENCOUNTER
Called pt and let him know he is going to call the sleep dr he has a c pap machine but is on the list to get a new one.

## 2022-07-13 NOTE — TELEPHONE ENCOUNTER
Pt has been waking up with crackly throat the past week but worse today. His voice is weak and sore throat for a week. No cough but has to clear his throat a lot. Believes he has laryngitis but has not taken anything over the counter. He has not had this in the past and would like to know what he can do to help.     Please Advise  Pt can be reached at [phone]

## 2022-07-13 NOTE — TELEPHONE ENCOUNTER
May take Mucinex (guaifenisen) and Robitussin DM (guafenisen, dextromethorphan) for cough and congestion. AVOID decongestants like phenylephrine and pseudoephedrine. AVOID Afrin. Is he snoring or having heartburn that may be irritating throat? Please be seen if symptoms getting worse after 7 days from onset, last longer than 10 days, having high fevers, having trouble breathing in chest or extremely ill. See us next week if not better.

## 2022-07-20 ENCOUNTER — OFFICE VISIT (OUTPATIENT)
Dept: ENDOCRINOLOGY | Age: 57
End: 2022-07-20
Payer: COMMERCIAL

## 2022-07-20 DIAGNOSIS — E11.9 TYPE 2 DIABETES MELLITUS WITHOUT COMPLICATION, WITHOUT LONG-TERM CURRENT USE OF INSULIN (HCC): Primary | ICD-10-CM

## 2022-07-20 PROCEDURE — 3051F HG A1C>EQUAL 7.0%<8.0%: CPT | Performed by: DIETITIAN, REGISTERED

## 2022-07-20 PROCEDURE — 97802 MEDICAL NUTRITION INDIV IN: CPT | Performed by: DIETITIAN, REGISTERED

## 2022-07-20 NOTE — PROGRESS NOTES
Medical Nutrition Therapy for Diabetes    Pat Nagel  July 20, 2022      Patient Care Team:  Jayson Brewer MD as PCP - General (Internal Medicine)  Jayson Brewer MD as PCP - [facility] Empaneled Provider  Shannan Jorgensen MD as Consulting Physician (General Surgery)    Reason for visit: Type 2 Diabetes    ASSESSMENT/PLAN:   NUTRITION DIAGNOSIS  Initial Visit    #1 Problem: Altered Nutrition-Related Laboratory Values (NC-2.2)  Related to: Endocrine/Diabetes   As Evidenced by: Elevated Plasma glucose and/or HgbA1c levels          #2 Problem: Excessive Carbohydrate Intake (NI-5.8. 2)  Related to: Food-and nutrition-related knowledge deficit concerning appropriate amount of carbohydrate intake  As evidenced by: Estimated carbohydrate intake that is consistently more than the recommended amounts     #3 Problem: Fluids-NI-3.1                      Inadequate fluids    NUTRITION INTERVENTION  Nutrition Prescription: 30 - 45 grams carbohydrate per meal with protein and non-starch vegetables  15 gram carbohydrate snacks     Diabetes Education/Counseling included: Pathophysiology of Diabetes   Carbohydrate Control, Activity/exercise, Label-reading, Monitoring, and Medications, Benefits of adequate hydration, quality sleep and stress management   Identified carbohydrate food to count in limitation. Interventions:  Control Carbohydrate Intake using Plate Guide, Control Carbohydrate Intake using Carb Counting, Increase intake of vegetables, and Increase activity level by walking  Recommended 30-45 gram carbohydrate meals with Protein and non-starch vegetables. Encouraged increased activity with walking or stationary bike and resistance bands at home. Suggested increasing water to 64 ounces daily.   Handouts: Healthy Eating Guidelines for Diabetes-[employer], Sample menus-[employer], Planning Healthy Meals-[facility]   NUTRITION MONITORING AND EVALUATION  3 meals  30 - 45 gram carbohydrate meals with protein  Increase activity       Patient Active Problem List   Diagnosis    Diverticulitis of intestine with perforation    AVN (avascular necrosis of bone) ([facility])    H/O total hip arthroplasty    Leukocytosis    Pneumoperitoneum from diverticulitis    Diverticulitis of colon    Rotator cuff (capsule) sprain    Other pneumothorax  twice in his 20's ( was thin)    CPAP (continuous positive airway pressure) dependence    Exercise intolerance    Orthostatic hypotension    Asthma    Chest tightness    SOB (shortness of breath)    Family history of leukemia brother    History of leukocytosis    Dizziness       Current Outpatient Medications   Medication Sig Dispense Refill    traZODone (DESYREL) 50 MG tablet Take 1 tablet by mouth nightly as needed for Sleep 90 tablet 1    Semaglutide (RYBELSUS) 3 MG TABS Take one pill  30 min before eating with 4 oz of water 30 tablet 0    Semaglutide 7 MG TABS Take one pill daily 30 tablet 0    rosuvastatin (CRESTOR) 5 MG tablet Take 1 tablet by mouth nightly 90 tablet 1    glucose monitoring (FREESTYLE FREEDOM) kit 1 kit by Does not apply route daily 1 kit 0    blood glucose monitor strips Test   BID or as needed for high or low glucose 100 strip 3    blood glucose monitor strips Test 2  times a day & as needed for symptoms 100 strip 3    Lancets MISC 1 each by Does not apply route daily Test  each 3    furosemide (LASIX) 20 MG tablet Take 1 tablet by mouth daily 90 tablet 1    ibuprofen (ADVIL;MOTRIN) 200 MG tablet Take 800 mg by mouth every 6 hours as needed  (Patient not taking: Reported on 6/1/2022)      aspirin 81 MG EC tablet Take 81 mg by mouth daily      albuterol sulfate  (90 Base) MCG/ACT inhaler INHALE 2 PUFFS BY MOUTH EVERY 6 HOURS AS NEEDED      omeprazole (PRILOSEC) 40 MG delayed release capsule Take 40 mg by mouth 2 times daily        No current facility-administered medications for this visit.          NUTRITION ASSESSMENT    Biochemical Data:    Lab Results   Component Value Date    LABA1C 7.3 04/14/2022     Lab Results   Component Value Date    .8 04/14/2022       Lab Results   Component Value Date    CHOL 204 (H) 04/14/2022    CHOL 228 (H) 10/07/2021     Lab Results   Component Value Date    TRIG 134 04/14/2022    TRIG 137 10/07/2021     Lab Results   Component Value Date    HDL 42 04/14/2022    HDL 57 10/07/2021     Lab Results   Component Value Date    LDLCALC 135 (H) 04/14/2022    LDLCALC 144 (H) 10/07/2021     Lab Results   Component Value Date    LABVLDL 27 04/14/2022    LABVLDL 27 10/07/2021     No results found for: [facility]    Lab Results   Component Value Date    WBC 11.0 04/14/2022    HGB 13.2 (L) 04/14/2022    HCT 39.5 (L) 04/14/2022    MCV 90.6 04/14/2022     04/14/2022       Lab Results   Component Value Date    CREATININE 0.9 04/14/2022    BUN 16 04/14/2022     04/14/2022    K 4.1 04/14/2022     04/14/2022    CO2 20 (L) 04/14/2022       Diabetes Medications: Yes  Knows name and dose of prescribed medications Yes  Knows prescribed schedule for medicationsNo  Recent change in medication type/dosage: No  Stores  medications properlyYes  Comments:     Monitoring:   Has BG meter: Yes  Testing frequency: irregular  Recent results: shortly after meals-200  Hypoglycemia? No    Anthropometric Measurements:   Wt:   Wt Readings from Last 3 Encounters:   06/01/22 (!) 302 lb (137 kg)   05/19/22 (!) 300 lb 3.2 oz (136.2 kg)   05/04/22 (!) 301 lb (136.5 kg)      BMI:   BMI Readings from Last 3 Encounters:   06/01/22 38.77 kg/m²   05/19/22 38.54 kg/m²   05/04/22 38.65 kg/m²     Patient's stated goal weight:   7% Weight loss goal weight:     Physical Activity History:   Physical activity: gym workouts/ dog walking  Frequency of activity: 2/week/1/day for dog walking  Duration of activity: 30-45 minutes  Obstacles to activity: none    Sleep: MARIA ISABEL, uses C-pap    Food and Nutrition History:   Nutrition Awareness/Previous DSMES: No  Number of people in household: 2  Frequency of Meals Eaten away from home:3/week  Food Availability Problems  Within the past 12 months, have you worried that your food would run out before you got money to buy more? No  Within the past 12 months, has the food you bought not lasted till the end of the month and you didn't have money to get more? No  Beverage consumption: water - 20 ounces, juice-not daily, 12 ounces 2-3 /week, sweet tea-  2 glasses not every day, coffee- 1 tbsp. W/ Sugar-12 ounces  Alcohol consumption: No  Usual Food consumption:   2 meals, 45-60 gram carbohydrate meals     Barriers:   -none          Follow Up Plan: Will remain available. Contact information given.      Referring Provider: Nelly Mathews MD  Time spent with patient: 60 minutes

## 2022-08-03 NOTE — TELEPHONE ENCOUNTER
Call pt,  I need to know how the  7 mg dose is going?   He also needs a hga1c so in person appt would be better but   I could do VV tomorrow if no spots today or Friday and send him to the lab for hga1c  Let me know

## 2022-08-10 ENCOUNTER — OFFICE VISIT (OUTPATIENT)
Dept: FAMILY MEDICINE CLINIC | Age: 57
End: 2022-08-10
Payer: COMMERCIAL

## 2022-08-10 VITALS
DIASTOLIC BLOOD PRESSURE: 78 MMHG | RESPIRATION RATE: 12 BRPM | OXYGEN SATURATION: 95 % | HEART RATE: 86 BPM | SYSTOLIC BLOOD PRESSURE: 116 MMHG | WEIGHT: 299 LBS | BODY MASS INDEX: 38.39 KG/M2

## 2022-08-10 DIAGNOSIS — R07.89 CHEST DISCOMFORT: ICD-10-CM

## 2022-08-10 DIAGNOSIS — E61.1 IRON DEFICIENCY: ICD-10-CM

## 2022-08-10 DIAGNOSIS — R00.0 TACHYCARDIA: Primary | ICD-10-CM

## 2022-08-10 DIAGNOSIS — E11.9 TYPE 2 DIABETES MELLITUS WITHOUT COMPLICATION, WITHOUT LONG-TERM CURRENT USE OF INSULIN (HCC): ICD-10-CM

## 2022-08-10 DIAGNOSIS — Z00.00 LABORATORY TESTS ORDERED AS PART OF A COMPLETE PHYSICAL EXAM (CPE): ICD-10-CM

## 2022-08-10 DIAGNOSIS — D22.9 NEVUS: ICD-10-CM

## 2022-08-10 DIAGNOSIS — L98.9 SKIN LESION OF BACK: ICD-10-CM

## 2022-08-10 LAB
A/G RATIO: 1.3 (ref 1.1–2.2)
ALBUMIN SERPL-MCNC: 3.9 G/DL (ref 3.4–5)
ALP BLD-CCNC: 119 U/L (ref 40–129)
ALT SERPL-CCNC: 20 U/L (ref 10–40)
ANION GAP SERPL CALCULATED.3IONS-SCNC: 12 MMOL/L (ref 3–16)
AST SERPL-CCNC: 19 U/L (ref 15–37)
BASOPHILS ABSOLUTE: 0.1 K/UL (ref 0–0.2)
BASOPHILS RELATIVE PERCENT: 0.6 %
BILIRUB SERPL-MCNC: 0.3 MG/DL (ref 0–1)
BUN BLDV-MCNC: 14 MG/DL (ref 7–20)
CALCIUM SERPL-MCNC: 9.4 MG/DL (ref 8.3–10.6)
CHLORIDE BLD-SCNC: 106 MMOL/L (ref 99–110)
CHOLESTEROL, TOTAL: 153 MG/DL (ref 0–199)
CO2: 25 MMOL/L (ref 21–32)
CREAT SERPL-MCNC: 0.9 MG/DL (ref 0.9–1.3)
CREATININE URINE: 256.8 MG/DL (ref 39–259)
EOSINOPHILS ABSOLUTE: 0.2 K/UL (ref 0–0.6)
EOSINOPHILS RELATIVE PERCENT: 1.8 %
GFR AFRICAN AMERICAN: >60
GFR NON-AFRICAN AMERICAN: >60
GLUCOSE BLD-MCNC: 122 MG/DL (ref 70–99)
HBA1C MFR BLD: 6.3 %
HCT VFR BLD CALC: 41.1 % (ref 40.5–52.5)
HDLC SERPL-MCNC: 44 MG/DL (ref 40–60)
HEMOGLOBIN: 13.5 G/DL (ref 13.5–17.5)
IRON SATURATION: 24 % (ref 20–50)
IRON: 76 UG/DL (ref 59–158)
LDL CHOLESTEROL CALCULATED: 86 MG/DL
LYMPHOCYTES ABSOLUTE: 2.7 K/UL (ref 1–5.1)
LYMPHOCYTES RELATIVE PERCENT: 25.5 %
MCH RBC QN AUTO: 29.4 PG (ref 26–34)
MCHC RBC AUTO-ENTMCNC: 32.8 G/DL (ref 31–36)
MCV RBC AUTO: 89.5 FL (ref 80–100)
MICROALBUMIN UR-MCNC: <1.2 MG/DL
MICROALBUMIN/CREAT UR-RTO: NORMAL MG/G (ref 0–30)
MONOCYTES ABSOLUTE: 0.9 K/UL (ref 0–1.3)
MONOCYTES RELATIVE PERCENT: 8.1 %
NEUTROPHILS ABSOLUTE: 6.8 K/UL (ref 1.7–7.7)
NEUTROPHILS RELATIVE PERCENT: 64 %
PDW BLD-RTO: 14.8 % (ref 12.4–15.4)
PLATELET # BLD: 306 K/UL (ref 135–450)
PMV BLD AUTO: 7.9 FL (ref 5–10.5)
POTASSIUM SERPL-SCNC: 4.7 MMOL/L (ref 3.5–5.1)
RBC # BLD: 4.59 M/UL (ref 4.2–5.9)
SODIUM BLD-SCNC: 143 MMOL/L (ref 136–145)
TOTAL IRON BINDING CAPACITY: 315 UG/DL (ref 260–445)
TOTAL PROTEIN: 6.9 G/DL (ref 6.4–8.2)
TRIGL SERPL-MCNC: 117 MG/DL (ref 0–150)
TROPONIN: <0.01 NG/ML
TSH REFLEX: 1.29 UIU/ML (ref 0.27–4.2)
VLDLC SERPL CALC-MCNC: 23 MG/DL
WBC # BLD: 10.6 K/UL (ref 4–11)

## 2022-08-10 PROCEDURE — 93000 ELECTROCARDIOGRAM COMPLETE: CPT | Performed by: INTERNAL MEDICINE

## 2022-08-10 PROCEDURE — 1036F TOBACCO NON-USER: CPT | Performed by: INTERNAL MEDICINE

## 2022-08-10 PROCEDURE — 99214 OFFICE O/P EST MOD 30 MIN: CPT | Performed by: INTERNAL MEDICINE

## 2022-08-10 PROCEDURE — 3044F HG A1C LEVEL LT 7.0%: CPT | Performed by: INTERNAL MEDICINE

## 2022-08-10 PROCEDURE — 83036 HEMOGLOBIN GLYCOSYLATED A1C: CPT | Performed by: INTERNAL MEDICINE

## 2022-08-10 PROCEDURE — G8427 DOCREV CUR MEDS BY ELIG CLIN: HCPCS | Performed by: INTERNAL MEDICINE

## 2022-08-10 PROCEDURE — 2022F DILAT RTA XM EVC RTNOPTHY: CPT | Performed by: INTERNAL MEDICINE

## 2022-08-10 PROCEDURE — 3017F COLORECTAL CA SCREEN DOC REV: CPT | Performed by: INTERNAL MEDICINE

## 2022-08-10 PROCEDURE — G8417 CALC BMI ABV UP PARAM F/U: HCPCS | Performed by: INTERNAL MEDICINE

## 2022-08-10 NOTE — PROGRESS NOTES
Teresa Lopez (:  1965) is a 56 y.o. male, here for evaluation of the following chief complaint(s):  Diabetes         ASSESSMENT/PLAN:  Jose Bowers was seen today for diabetes. Diagnoses and all orders for this visit:    Tachycardia  -     Cancel: EKG 12 lead; Future  -     EKG 12 Lead - Clinic Performed   NSR rate  80 AL and QTC are normal  No concerning t wave abn    Type 2 diabetes mellitus without complication, without long-term current use of insulin ([facility] [address]- dx , rybelsus  -     POCT glycosylated hemoglobin (Hb A1C)    Laboratory tests ordered as part of a complete physical exam (CPE)  -     CBC with Auto Differential; Future  -     Comprehensive Metabolic Panel; Future  -     Lipid Panel; Future  -     TSH with Reflex; Future  -     Microalbumin / Creatinine Urine Ratio; Future    Chest discomfort  -     Cancel: Troponin; Future  -     Troponin; Future    Nevus    Skin lesion of back  -     [place] -  Wendee Duverney, CNP, Dermatology, [place]    Other orders  -     Semaglutide 7 MG TABS; Take one pill daily    Having episodes of tachycardia  Last night 's  Told him to let Terrence Christopher know  He already did an event monitor  May need a loop recorder  Next time go to ER  Dm much better on the semaglutide  Will ck labs for cpe         SUBJECTIVE/OBJECTIVE:  Diabetes    Said heart rate  jumped to 170 sitting yesterday. Took off this weeks. Lasted about  2 hours. tends to go away when he gets up and walks around   He did not go to the er bc the last time it happed it went down when he went back . If he walks it tends to stop it. Happens about once a month. No shortness of breath  Felt a little discomfort in the chest  . Very mild for   about  30 min  Felt regular   Has a [employer] [facility] watch     Going on since march . Hga1c  6.3  on semaglutide 7 mg   Has been on  7mg for a month  Am glucose 116, forgets to check after dinner  Generally  145.       Lab Results   Component Value Date LABA1C 7.3 04/14/2022    LABA1C 6.1 09/21/2021    LABMICR YES 05/26/2016    LABMICR YES 09/10/2015    LABMICR YES 07/17/2014       Lab Results   Component Value Date     04/14/2022     04/13/2022     09/30/2021    K 4.1 04/14/2022    K 3.7 04/13/2022    K 4.8 09/30/2021     04/14/2022     04/13/2022     09/30/2021    CO2 20 (L) 04/14/2022    CO2 24 04/13/2022    CO2 24 09/30/2021    BUN 16 04/14/2022    BUN 20 04/13/2022    BUN 13 09/30/2021    CREATININE 0.9 04/14/2022    CREATININE 1.0 04/13/2022    CREATININE 1.1 09/30/2021    GLUCOSE 167 (H) 04/14/2022    GLUCOSE 261 (H) 04/13/2022    GLUCOSE 72 09/30/2021    CALCIUM 8.9 04/14/2022    CALCIUM 9.3 04/13/2022    CALCIUM 9.6 09/30/2021       Lab Results   Component Value Date    CHOL 204 (H) 04/14/2022    CHOL 228 (H) 10/07/2021    TRIG 134 04/14/2022    TRIG 137 10/07/2021    HDL 42 04/14/2022    HDL 57 10/07/2021    LDLCALC 135 (H) 04/14/2022    LDLCALC 144 (H) 10/07/2021       Lab Results   Component Value Date    ALT 23 09/21/2021    ALT 14 09/10/2015    ALT 17 07/25/2014    AST 20 09/21/2021    AST 13 (L) 09/10/2015    AST 18 07/25/2014       No results found for: TSH, T4FREE, T3FREE    Lab Results   Component Value Date    WBC 11.0 04/14/2022    WBC 12.3 (H) 04/13/2022    WBC 10.8 09/21/2021    HGB 13.2 (L) 04/14/2022    HGB 13.9 04/13/2022    HGB 11.8 (L) 09/21/2021    HCT 39.5 (L) 04/14/2022    HCT 42.2 04/13/2022    HCT 37.0 (L) 09/21/2021    MCV 90.6 04/14/2022    MCV 90.5 04/13/2022    MCV 76.0 (L) 09/21/2021     04/14/2022     04/13/2022     09/21/2021       Lab Results   Component Value Date    INR 1.42 (H) 07/25/2014        No results found for: PSA     No results found for: LABURIC      Review of Systems       Objective   Physical Exam  Constitutional:       Appearance: Normal appearance. He is well-developed. HENT:      Head: Normocephalic and atraumatic.    Cardiovascular:      Rate and Rhythm: Normal rate and regular rhythm. Pulses: Normal pulses. Heart sounds: No murmur heard. Pulmonary:      Effort: Pulmonary effort is normal.      Breath sounds: Normal breath sounds. No wheezing. Skin:     General: Skin is warm and dry. Comments: White lesion , red around it on the back   Neurological:      Mental Status: He is alert. Psychiatric:         Mood and Affect: Mood normal.         Behavior: Behavior normal.         Thought Content:  Thought content normal.         Judgment: Judgment normal.          Sylvia Steel MD

## 2022-09-01 ENCOUNTER — OFFICE VISIT (OUTPATIENT)
Dept: FAMILY MEDICINE CLINIC | Age: 57
End: 2022-09-01
Payer: COMMERCIAL

## 2022-09-01 ENCOUNTER — TELEPHONE (OUTPATIENT)
Dept: CARDIOLOGY CLINIC | Age: 57
End: 2022-09-01

## 2022-09-01 VITALS
WEIGHT: 297 LBS | HEART RATE: 94 BPM | DIASTOLIC BLOOD PRESSURE: 82 MMHG | BODY MASS INDEX: 38.12 KG/M2 | RESPIRATION RATE: 12 BRPM | SYSTOLIC BLOOD PRESSURE: 126 MMHG | HEIGHT: 74 IN | OXYGEN SATURATION: 93 %

## 2022-09-01 DIAGNOSIS — R00.2 PALPITATIONS: ICD-10-CM

## 2022-09-01 DIAGNOSIS — R06.02 SOB (SHORTNESS OF BREATH): Primary | ICD-10-CM

## 2022-09-01 DIAGNOSIS — Z00.00 PE (PHYSICAL EXAM), ANNUAL: Primary | ICD-10-CM

## 2022-09-01 DIAGNOSIS — E11.9 TYPE 2 DIABETES MELLITUS WITHOUT COMPLICATION, WITHOUT LONG-TERM CURRENT USE OF INSULIN (HCC): ICD-10-CM

## 2022-09-01 DIAGNOSIS — E04.1 THYROID NODULE: ICD-10-CM

## 2022-09-01 DIAGNOSIS — R00.0 TACHYCARDIA: ICD-10-CM

## 2022-09-01 PROCEDURE — 99396 PREV VISIT EST AGE 40-64: CPT | Performed by: INTERNAL MEDICINE

## 2022-09-01 SDOH — ECONOMIC STABILITY: FOOD INSECURITY: WITHIN THE PAST 12 MONTHS, THE FOOD YOU BOUGHT JUST DIDN'T LAST AND YOU DIDN'T HAVE MONEY TO GET MORE.: NEVER TRUE

## 2022-09-01 SDOH — ECONOMIC STABILITY: FOOD INSECURITY: WITHIN THE PAST 12 MONTHS, YOU WORRIED THAT YOUR FOOD WOULD RUN OUT BEFORE YOU GOT MONEY TO BUY MORE.: NEVER TRUE

## 2022-09-01 ASSESSMENT — SOCIAL DETERMINANTS OF HEALTH (SDOH): HOW HARD IS IT FOR YOU TO PAY FOR THE VERY BASICS LIKE FOOD, HOUSING, MEDICAL CARE, AND HEATING?: NOT HARD AT ALL

## 2022-09-01 ASSESSMENT — ENCOUNTER SYMPTOMS
SHORTNESS OF BREATH: 1
CONSTIPATION: 0
EYE PAIN: 0
DIARRHEA: 0
NAUSEA: 0
COLOR CHANGE: 0
WHEEZING: 1
VOMITING: 0

## 2022-09-01 NOTE — PROGRESS NOTES
9/1/2022    Chief Complaint   Patient presents with    Annual Exam           HPI  Here for cpe  Has slight  shortness of breath- for example long walk quickly , walking in a hurry . Some tachycardia- he messaged cardiology  Heart rate 80-90 -   170 or more  Happens lying down to bed  Reads e books  Sometimes last few min  One time lasted 2 hrs  in the 160-170     Has been more frequent recently   Pt wants an event monitor  Wore and event monitor before but never had the events     The other day  hr at night was  169 , lasted 10 mid     Hga1c dropped by a point   Taking rybelsus   On  7 mg dose   Curbs appetite  Exercise , lost wt. Review of Systems   Constitutional:  Negative for fatigue and unexpected weight change. HENT:  Negative for hearing loss and tinnitus. Eyes:  Negative for pain and visual disturbance. Respiratory:  Positive for shortness of breath and wheezing. Cardiovascular:  Positive for palpitations (up to 170) and leg swelling. Negative for chest pain. Gastrointestinal:  Negative for constipation, diarrhea, nausea and vomiting. Endocrine: Negative for cold intolerance and heat intolerance. Genitourinary:  Negative for dysuria and frequency. Musculoskeletal:  Negative for gait problem and joint swelling. Skin:  Negative for color change and rash. Neurological:  Positive for headaches. Negative for dizziness. Psychiatric/Behavioral:  Negative for dysphoric mood. The patient is not nervous/anxious.       Health Maintenance   Topic Date Due    Diabetic foot exam  Never done    HIV screen  Never done    Hepatitis C screen  Never done    Hepatitis B vaccine (1 of 3 - Risk 3-dose series) Never done    Shingles vaccine (3 of 3) 11/27/2020    Pneumococcal 0-64 years Vaccine (2 - PCV) 10/08/2021    COVID-19 Vaccine (4 - Booster for Pfizer series) 06/10/2022    Flu vaccine (1) 09/01/2022    Depression Screen  05/04/2023    Diabetic retinal exam  05/31/2023    A1C test (Diabetic or Prediabetic)  08/10/2023    Diabetic microalbuminuria test  08/10/2023    Lipids  08/10/2023    DTaP/Tdap/Td vaccine (2 - Td or Tdap) 2025    Colorectal Cancer Screen  2032    Hepatitis A vaccine  Aged Out    Hib vaccine  Aged Out    Meningococcal (ACWY) vaccine  Aged Out      Social History     Tobacco Use    Smoking status: Former     Packs/day: 0.25     Years: 30.00     Pack years: 7.50     Types: Cigarettes     Start date: 3/11/1985     Quit date: 2015     Years since quittin.7    Smokeless tobacco: Never   Vaping Use    Vaping Use: Never used   Substance Use Topics    Alcohol use: No    Drug use: No      Family History   Problem Relation Age of Onset    Other Father         CHF    Cancer Father         prostate    Asthma Other     Heart Disease Other     High Blood Pressure Other     Cancer Brother         leukemia    Heart Attack Brother      Prior to Visit Medications    Medication Sig Taking?  Authorizing Provider   Semaglutide 7 MG TABS Take one pill daily Yes Sameera Aguilar MD   traZODone (DESYREL) 50 MG tablet Take 1 tablet by mouth nightly as needed for Sleep Yes Sameera Aguilar MD   rosuvastatin (CRESTOR) 5 MG tablet Take 1 tablet by mouth nightly Yes Sameera Aguilar MD   glucose monitoring (FREESTYLE FREEDOM) kit 1 kit by Does not apply route daily Yes Sameera Aguilar MD   blood glucose monitor strips Test   BID or as needed for high or low glucose Yes Sameera Aguilar MD   blood glucose monitor strips Test 2  times a day & as needed for symptoms Yes Sameera Aguilar MD   Lancets MISC 1 each by Does not apply route daily Test BID Yes Sameera Aguilar MD   furosemide (LASIX) 20 MG tablet Take 1 tablet by mouth daily Yes Sameera Aguilar MD   ibuprofen (ADVIL;MOTRIN) 200 MG tablet Take 800 mg by mouth every 6 hours as needed Yes Historical Provider, MD   aspirin 81 MG EC tablet Take 81 mg by mouth daily Yes Historical Provider, MD   omeprazole (PRILOSEC) 40 MG delayed release capsule Take 40 mg by mouth 2 times daily  Yes Historical Provider, MD     Patient Active Problem List   Diagnosis    Diverticulitis of intestine with perforation    AVN (avascular necrosis of bone) ([facility])    H/O total hip arthroplasty    Leukocytosis    Pneumoperitoneum from diverticulitis    Diverticulitis of colon    Rotator cuff (capsule) sprain    Other pneumothorax  twice in his 20's ( was thin)    CPAP (continuous positive airway pressure) dependence    Exercise intolerance    Orthostatic hypotension    Asthma    Chest tightness    SOB (shortness of breath)    Family history of leukemia brother    History of leukocytosis    Dizziness        LABS:     Lab Results   Component Value Date    LABA1C 6.3 08/10/2022    LABA1C 7.3 04/14/2022    LABA1C 6.1 09/21/2021    LABMICR <1.20 08/10/2022    LABMICR YES 05/26/2016    LABMICR YES 09/10/2015       Lab Results   Component Value Date     08/10/2022     04/14/2022     04/13/2022    K 4.7 08/10/2022    K 4.1 04/14/2022    K 3.7 04/13/2022     08/10/2022     04/14/2022     04/13/2022    CO2 25 08/10/2022    CO2 20 (L) 04/14/2022    CO2 24 04/13/2022    BUN 14 08/10/2022    BUN 16 04/14/2022    BUN 20 04/13/2022    CREATININE 0.9 08/10/2022    CREATININE 0.9 04/14/2022    CREATININE 1.0 04/13/2022    GLUCOSE 122 (H) 08/10/2022    GLUCOSE 167 (H) 04/14/2022    GLUCOSE 261 (H) 04/13/2022    CALCIUM 9.4 08/10/2022    CALCIUM 8.9 04/14/2022    CALCIUM 9.3 04/13/2022       Lab Results   Component Value Date    CHOL 153 08/10/2022    CHOL 204 (H) 04/14/2022    CHOL 228 (H) 10/07/2021    TRIG 117 08/10/2022    TRIG 134 04/14/2022    TRIG 137 10/07/2021    HDL 44 08/10/2022    HDL 42 04/14/2022    HDL 57 10/07/2021    LDLCALC 86 08/10/2022    LDLCALC 135 (H) 04/14/2022    LDLCALC 144 (H) 10/07/2021       Lab Results   Component Value Date    ALT 20 08/10/2022    ALT 23 09/21/2021    ALT 14 09/10/2015    AST 19 08/10/2022    AST 20 09/21/2021    AST 13 (L) 09/10/2015       No results found for: TSH, T4FREE, T3FREE    Lab Results   Component Value Date    WBC 10.6 08/10/2022    WBC 11.0 04/14/2022    WBC 12.3 (H) 04/13/2022    HGB 13.5 08/10/2022    HGB 13.2 (L) 04/14/2022    HGB 13.9 04/13/2022    HCT 41.1 08/10/2022    HCT 39.5 (L) 04/14/2022    HCT 42.2 04/13/2022    MCV 89.5 08/10/2022    MCV 90.6 04/14/2022    MCV 90.5 04/13/2022     08/10/2022     04/14/2022     04/13/2022       Lab Results   Component Value Date    INR 1.42 (H) 07/25/2014        No results found for: PSA     No results found for: LABURIC      No results found for: PSA, PSADIA     PHYSICAL EXAM:  /82 (Site: Right Upper Arm, Position: Sitting, Cuff Size: Large Adult)   Pulse 94   Resp 12   Ht 6' 2\" (1.88 m)   Wt 297 lb (134.7 kg)   SpO2 93%   BMI 38.13 kg/m²    Physical Exam  Constitutional:       Appearance: Normal appearance. He is well-developed. HENT:      Head: Normocephalic and atraumatic. Right Ear: Tympanic membrane and ear canal normal.      Left Ear: Tympanic membrane and ear canal normal.   Eyes:      General: No scleral icterus. Conjunctiva/sclera: Conjunctivae normal.   Neck:      Thyroid: No thyromegaly. Cardiovascular:      Rate and Rhythm: Normal rate and regular rhythm. Heart sounds: Normal heart sounds. No murmur heard. Pulmonary:      Effort: Pulmonary effort is normal. No respiratory distress. Breath sounds: Normal breath sounds. No wheezing. Abdominal:      General: There is no distension. Palpations: Abdomen is soft. Tenderness: There is no abdominal tenderness. Musculoskeletal:         General: No deformity. Cervical back: Neck supple. Skin:     General: Skin is warm and dry. Findings: No rash. Neurological:      Mental Status: He is alert. Motor: No abnormal muscle tone.       Comments: Motor 5/5 upper and lower ext   Speech clear    Microfilament testing normal bilat   Psychiatric:         Mood and Affect: Mood normal.         Behavior: Behavior normal.         Thought Content:  Thought content normal.         Judgment: Judgment normal.     BP Readings from Last 5 Encounters:   09/01/22 126/82   08/10/22 116/78   06/01/22 126/80   05/19/22 110/80   05/04/22 128/84       Wt Readings from Last 5 Encounters:   09/01/22 297 lb (134.7 kg)   08/10/22 299 lb (135.6 kg)   06/01/22 (!) 302 lb (137 kg)   05/19/22 (!) 300 lb 3.2 oz (136.2 kg)   05/04/22 (!) 301 lb (136.5 kg)   Kayla Bernard was seen today for annual exam.    Diagnoses and all orders for this visit:    PE (physical exam), annual    Thyroid nodule  -     US THYROID; Future    Tachycardia    Type 2 diabetes mellitus without complication, without long-term current use of insulin [facility]- dx 2022, rybelsus  -      DIABETES FOOT EXAM     Ordered the us  Labs were done  Follow up 3 month   He will address tachycardia  ( suspect svt) with his cardiologist  Dm much better with rybelsus

## 2022-09-01 NOTE — TELEPHONE ENCOUNTER
Called and spoke with patient he states he is having symptoms about once a week. I recommended a 30 day monitor. Patient agreeable. He has worn [employer] monitor in the past and would like to have it mailed to him. Order placed. Please enroll and mail to patient.

## 2022-09-01 NOTE — TELEPHONE ENCOUNTER
Gloria Maza called in this afternoon, he states he sent in a [employer] message to Dr. Paul Oquendo on 8/10 and he would like him to reconsider him wearing a heart monitor,he states his tachycardia is happening more frequently. He can be reached at [phone].

## 2022-09-01 NOTE — TELEPHONE ENCOUNTER
Monitor placed by Mailed to pt  Margot 33  Length of monitor 30 day  Monitor ordered by Dung Mae  Serial number N/a  Kit ID N/a  Activation successful prior to pt leaving office?  NA

## 2022-10-11 ENCOUNTER — TELEPHONE (OUTPATIENT)
Dept: CARDIOLOGY CLINIC | Age: 57
End: 2022-10-11

## 2022-10-11 PROCEDURE — 93248 EXT ECG>7D<15D REV&INTERPJ: CPT | Performed by: INTERNAL MEDICINE

## 2022-10-11 RX ORDER — METOPROLOL SUCCINATE 25 MG/1
25 TABLET, EXTENDED RELEASE ORAL DAILY
Qty: 90 TABLET | Refills: 3 | Status: SHIPPED | OUTPATIENT
Start: 2022-10-11

## 2022-10-11 NOTE — TELEPHONE ENCOUNTER
Dr. Galdino Cuellar reviewed patient's event monitor with runs of paroxsymal SVT and he recommended patient start Toprol 25 mg daily. Spoke with patient regarding results. An ablation was discussed with him by his PCP. Encouraged to try medical therapy first and if episodes continued or worsened to call the office and we can discuss an EP referral. He v/u to all and script sent to the pharmacy.

## 2022-10-13 DIAGNOSIS — R06.02 SOB (SHORTNESS OF BREATH): ICD-10-CM

## 2022-10-13 DIAGNOSIS — R00.2 PALPITATIONS: ICD-10-CM

## 2022-11-07 RX ORDER — ROSUVASTATIN CALCIUM 5 MG/1
5 TABLET, COATED ORAL NIGHTLY
Qty: 90 TABLET | Refills: 3 | Status: SHIPPED | OUTPATIENT
Start: 2022-11-07

## 2022-11-08 RX ORDER — ORAL SEMAGLUTIDE 7 MG/1
TABLET ORAL
Qty: 30 TABLET | Refills: 0 | Status: SHIPPED | OUTPATIENT
Start: 2022-11-08 | End: 2022-11-28

## 2022-11-08 NOTE — TELEPHONE ENCOUNTER
Called pt  Scheduled appt    Pt would like to know if he could a month supply sent to [facility] until his appt for Rybelsus.     Please Advise

## 2022-11-09 RX ORDER — FLECAINIDE ACETATE 50 MG/1
50 TABLET ORAL 2 TIMES DAILY
Qty: 60 TABLET | Refills: 3 | Status: SHIPPED | OUTPATIENT
Start: 2022-11-09

## 2022-11-09 NOTE — TELEPHONE ENCOUNTER
Tell pt I refilled however I need to  see him in the office  I have to be careful with this med and dehydration  I would like to talk with him and possibly do labs  I could do vv  Pls help him sched

## 2022-11-16 ENCOUNTER — OFFICE VISIT (OUTPATIENT)
Dept: CARDIOLOGY CLINIC | Age: 57
End: 2022-11-16
Payer: COMMERCIAL

## 2022-11-16 VITALS
SYSTOLIC BLOOD PRESSURE: 116 MMHG | HEART RATE: 93 BPM | WEIGHT: 298 LBS | DIASTOLIC BLOOD PRESSURE: 72 MMHG | HEIGHT: 74 IN | BODY MASS INDEX: 38.24 KG/M2 | OXYGEN SATURATION: 95 %

## 2022-11-16 DIAGNOSIS — G47.33 OSA (OBSTRUCTIVE SLEEP APNEA): ICD-10-CM

## 2022-11-16 DIAGNOSIS — I47.1 SVT (SUPRAVENTRICULAR TACHYCARDIA) (HCC): Primary | ICD-10-CM

## 2022-11-16 PROCEDURE — G8484 FLU IMMUNIZE NO ADMIN: HCPCS | Performed by: INTERNAL MEDICINE

## 2022-11-16 PROCEDURE — 1036F TOBACCO NON-USER: CPT | Performed by: INTERNAL MEDICINE

## 2022-11-16 PROCEDURE — 99204 OFFICE O/P NEW MOD 45 MIN: CPT | Performed by: INTERNAL MEDICINE

## 2022-11-16 PROCEDURE — G8417 CALC BMI ABV UP PARAM F/U: HCPCS | Performed by: INTERNAL MEDICINE

## 2022-11-16 PROCEDURE — 93000 ELECTROCARDIOGRAM COMPLETE: CPT | Performed by: INTERNAL MEDICINE

## 2022-11-16 PROCEDURE — G8427 DOCREV CUR MEDS BY ELIG CLIN: HCPCS | Performed by: INTERNAL MEDICINE

## 2022-11-16 PROCEDURE — 3017F COLORECTAL CA SCREEN DOC REV: CPT | Performed by: INTERNAL MEDICINE

## 2022-11-16 NOTE — PROGRESS NOTES
Cardiac Electrophysiology Consultation   Date: 2022   Reason for Consultation:  SVT  Consult Requesting Physician: Leia Rodriguez MD  Primary Care Physician: Amadou Manzano MD     Chief Complaint:   Chief Complaint   Patient presents with    New Patient     PSVT        HPI: Yesica Mosley is a 56 y.o. patient with a history of MARIA ISABEL with CPAP, asthma and SVT. SVT was first noted on Event monitor 9/10/2022 at 2305. Today, he presents to office referred by his cardiologist ADRIAN Gracia for evaluation of SVT. EKG today has significant artifact and patient was asked to go to hospital of an EKG today. He reports that he has not had any reoccurrence of SVT since starting Flecainide. Reports BP is lower today as he typically runs 's. Episodes occur while laying in bed at night and episodes lasting as long a two hours. Reports symptoms of palpitation and heart racing. Denies chest pain/ pressure, shortness of breath, presyncope of syncope. Denies any family history of SVT of atrial fibrillation. Reports brother  in his early 52's of heart attack. He states he feels like he does not drink enough fluids. He denies any previous COVID infection.       Past Medical History:   Diagnosis Date    Anxiety     Anxiety 2005    Asthma 2019    exercise-induced asthma    Bursitis 2016    both shoulders    Depression     Depression 2019    Diverticula of colon     Diverticulitis 2019    of colon    GERD (gastroesophageal reflux disease)     Orthostatic hypotension 2021    Reflux esophagitis 2005    Sleep apnea     cpap      Past Surgical History:   Procedure Laterality Date    COLON SURGERY      COLONOSCOPY      COLONOSCOPY N/A 2022    COLONOSCOPY POLYPECTOMY SNARE/COLD BIOPSY performed by Ingris Ellis MD at [address] [place] [address]  2014    closed    HIP SURGERY Bilateral L 2006, R 2007    Total Arthroplasty    JOINT REPLACEMENT  2019    hx bilateral hip replacements    LAPAROTOMY  7-17-14    EXPLORATORY LAPAROTOMY, BOWEL RESECTION, COLOSTOMY    LUNG SURGERY      OTHER SURGICAL HISTORY  7/24/14    OTHER SURGICAL HISTORY  10/5/15    Excision of sutures from abdominal wound    REVISION COLOSTOMY  11/19/14    Reversal    ROTATOR CUFF REPAIR  2019    THORACENTESIS      insert chest tube-age 25    UPPER GASTROINTESTINAL ENDOSCOPY N/A 1/28/2022    EGD BIOPSY performed by Luis Antonio Mcknight MD at [address]:  No Known Allergies    Medication:   Prior to Admission medications    Medication Sig Start Date End Date Taking?  Authorizing Provider   flecainide (TAMBOCOR) 50 MG tablet Take 1 tablet by mouth 2 times daily 11/9/22  Yes Amanda Webber MD   Semaglutide (RYBELSUS) 7 MG TABS TAKE 1 TABLET BY MOUTH ONE TIME A DAY 11/8/22  Yes Vazquez Fragoso MD   rosuvastatin (CRESTOR) 5 MG tablet TAKE 1 TABLET BY MOUTH NIGHTLY 11/7/22  Yes Vazquez Fragoso MD   metoprolol succinate (TOPROL XL) 25 MG extended release tablet Take 1 tablet by mouth daily 10/11/22  Yes Amanda Webber MD   traZODone (DESYREL) 50 MG tablet Take 1 tablet by mouth nightly as needed for Sleep 6/1/22  Yes Vazquez Fragoso MD   glucose monitoring (FREESTYLE FREEDOM) kit 1 kit by Does not apply route daily 5/4/22  Yes Vazquez Fragoso MD   blood glucose monitor strips Test   BID or as needed for high or low glucose 5/4/22  Yes Vazquez Fragoso MD   blood glucose monitor strips Test 2  times a day & as needed for symptoms 5/4/22  Yes Vazquez Fragoso MD   Lancets MISC 1 each by Does not apply route daily Test BID 5/4/22  Yes Vazquez Fragoso MD   furosemide (LASIX) 20 MG tablet Take 1 tablet by mouth daily 5/4/22  Yes Vazquez Fragoso MD   ibuprofen (ADVIL;MOTRIN) 200 MG tablet Take 800 mg by mouth every 6 hours as needed   Yes Historical Provider, MD   aspirin 81 MG EC tablet Take 81 mg by mouth daily   Yes Historical Provider, MD   omeprazole (PRILOSEC) 40 MG delayed release capsule Take 40 mg by mouth 2 times daily    Yes Historical Provider, MD       Social History:   reports that he quit smoking about 6 years ago. His smoking use included cigarettes. He started smoking about 37 years ago. He has a 7.50 pack-year smoking history. He has never used smokeless tobacco. He reports that he does not drink alcohol and does not use drugs. Family History:  family history includes Asthma in an other family member; Cancer in his brother and father; Heart Attack in his brother; Heart Disease in an other family member; High Blood Pressure in an other family member; Other in his father. Reviewed. Denies family history of sudden cardiac death, arrhythmia, premature CAD    Review of System:  Pertinent positive and negatives are in the HPI, the rest are negative. Physical Examination:  /72 (Site: Left Upper Arm, Position: Sitting)   Pulse 93   Ht 6' 2\" (1.88 m)   Wt 298 lb (135.2 kg)   SpO2 95%   BMI 38.26 kg/m²      Constitutional: Oriented. No distress. Head: Normocephalic and atraumatic. Mouth/Throat: Oropharynx is clear and moist.   Eyes: Conjunctivae normal. EOM are normal.   Neck: Normal range of motion. Neck supple. No rigidity. No JVD present. Cardiovascular: Normal rate, regular rhythm, S1&S2 and intact distal pulses. Pulmonary/Chest: Bilateral respiratory sounds. No wheezes. No rhonchi. Abdominal: Soft. Bowel sounds present. No distension, No tenderness. Musculoskeletal: No tenderness. No edema    Lymphadenopathy: Has no cervical adenopathy. Neurological: Alert and oriented. Cranial nerve appears intact, No Gross deficit   Skin: Skin is warm and dry. No rash noted. Psychiatric: Has a normal mood, affect and behavior     Labs:  Reviewed. ECG: reviewed, unclear rhythm, patient sent to Hospital to complete 12 lead EKG. Studies:   1. Event monitor: 9/7/2022-10/6/2022  SR with frequent runs of PSVT.     2. Echo: 01/14/22  Ejection fraction is visually estimated to be 55-60%. No regional wall motion abnormalities are noted. grade 1 diastolic dysfunction  Normal right ventricular size and function  No significant valvular heart disease    3. Stress Test:  n/a      4. Cath: n/a    I independently reviewed the ECG, MCOT, echocardiogram, stress test, and coronary angiography/PCI results and used them for my plan of care. Assessment/Plan:     Supraventricular tachycardia   - Seen on MCOT. - Symptomatic with palpitations and heart racing.   - Occurs at night when lying in bed. - Longest episode lasted 2 hours in duration.   - Continue Toprol XL 25 mg daily and Flecainide 50 mg BID for rate and rthymn control. Mr. Heather Breaux has thus far documented brief paroxysmal atrial tachycardia. He reports episodes of upto 2 hours. He was started on flecainide and reports significant improvement in symptoms. Reassured him that SVTs are typically not deadly. Given how brief the episodes are, ablation will be low yield at this point. If has sustained breakthrough episodes despite beta blockers and flecainide, would discontinue both medications for 2 weeks then plan for EPS with possible ablation.     - We educated the patient that this PSVT tends to be more symptomatic with age given the maximum exertional HR of 220-age. MARIA ISABEL   -Compliant with CPAP. Obesity   - lifestyle modification. Thank you for allowing me to participate in the care of Macy Fraser. All questions and concerns were addressed to the patient/family. Alternatives to my treatment were discussed. This note was scribed in the presence of Jovi Selby MD by Dominique Rodriguez RN. Physician attestation: The scribe's documentation has been prepared under my direction and has been personally reviewed by me in its entirety. I confirm that the note above reflects all work, treatment, procedures, and medical decision making performed by me.      Jovi Selby MD  Cardiac

## 2022-11-18 ENCOUNTER — TELEPHONE (OUTPATIENT)
Dept: FAMILY MEDICINE CLINIC | Age: 57
End: 2022-11-18

## 2022-11-18 RX ORDER — BENZONATATE 100 MG/1
100-200 CAPSULE ORAL 3 TIMES DAILY PRN
Qty: 30 CAPSULE | Refills: 0 | Status: SHIPPED | OUTPATIENT
Start: 2022-11-18 | End: 2022-11-28

## 2022-11-18 NOTE — TELEPHONE ENCOUNTER
Scratchy throat  Since Sunday 6 days ago  Sinus congestion  Cough   Clear nasal dc  Home covid test Monday  No bodyache  Ribs really note  All clear  Not shortness of breath  Taking cough drops ,salt water  Recommend mucinex DM  Cepacol    To urgent care if not better.   Cancelled vv bc poor reception

## 2022-11-28 ENCOUNTER — OFFICE VISIT (OUTPATIENT)
Dept: FAMILY MEDICINE CLINIC | Age: 57
End: 2022-11-28
Payer: COMMERCIAL

## 2022-11-28 VITALS
BODY MASS INDEX: 38.24 KG/M2 | SYSTOLIC BLOOD PRESSURE: 132 MMHG | RESPIRATION RATE: 16 BRPM | HEART RATE: 76 BPM | WEIGHT: 298 LBS | OXYGEN SATURATION: 95 % | HEIGHT: 74 IN | DIASTOLIC BLOOD PRESSURE: 86 MMHG

## 2022-11-28 DIAGNOSIS — I65.02 ASYMPTOMATIC STENOSIS OF LEFT VERTEBRAL ARTERY: ICD-10-CM

## 2022-11-28 DIAGNOSIS — Z23 NEED FOR INFLUENZA VACCINATION: ICD-10-CM

## 2022-11-28 DIAGNOSIS — E04.1 THYROID NODULE: ICD-10-CM

## 2022-11-28 DIAGNOSIS — E11.9 TYPE 2 DIABETES MELLITUS WITHOUT COMPLICATION, WITHOUT LONG-TERM CURRENT USE OF INSULIN (HCC): Primary | ICD-10-CM

## 2022-11-28 LAB — HBA1C MFR BLD: 6.5 %

## 2022-11-28 PROCEDURE — G8482 FLU IMMUNIZE ORDER/ADMIN: HCPCS | Performed by: INTERNAL MEDICINE

## 2022-11-28 PROCEDURE — 1036F TOBACCO NON-USER: CPT | Performed by: INTERNAL MEDICINE

## 2022-11-28 PROCEDURE — G8417 CALC BMI ABV UP PARAM F/U: HCPCS | Performed by: INTERNAL MEDICINE

## 2022-11-28 PROCEDURE — 2022F DILAT RTA XM EVC RTNOPTHY: CPT | Performed by: INTERNAL MEDICINE

## 2022-11-28 PROCEDURE — 99214 OFFICE O/P EST MOD 30 MIN: CPT | Performed by: INTERNAL MEDICINE

## 2022-11-28 PROCEDURE — 90674 CCIIV4 VAC NO PRSV 0.5 ML IM: CPT | Performed by: INTERNAL MEDICINE

## 2022-11-28 PROCEDURE — 3017F COLORECTAL CA SCREEN DOC REV: CPT | Performed by: INTERNAL MEDICINE

## 2022-11-28 PROCEDURE — 90471 IMMUNIZATION ADMIN: CPT | Performed by: INTERNAL MEDICINE

## 2022-11-28 PROCEDURE — G8427 DOCREV CUR MEDS BY ELIG CLIN: HCPCS | Performed by: INTERNAL MEDICINE

## 2022-11-28 PROCEDURE — 3044F HG A1C LEVEL LT 7.0%: CPT | Performed by: INTERNAL MEDICINE

## 2022-11-28 PROCEDURE — 83036 HEMOGLOBIN GLYCOSYLATED A1C: CPT | Performed by: INTERNAL MEDICINE

## 2022-11-28 RX ORDER — ORAL SEMAGLUTIDE 14 MG/1
TABLET ORAL
Qty: 30 TABLET | Refills: 0 | Status: SHIPPED | OUTPATIENT
Start: 2022-11-28

## 2022-11-28 ASSESSMENT — ENCOUNTER SYMPTOMS: COUGH: 0

## 2022-11-28 NOTE — PROGRESS NOTES
Ros Christopher (:  1965) is a 56 y.o. male, here for evaluation of the following chief complaint(s):  Diabetes (Patient is here for a diabetes follow up )    Mp Constantino was seen today for diabetes. Diagnoses and all orders for this visit:    Type 2 diabetes mellitus without complication, without long-term current use of insulin ([facility]- dx , rybelsus  -     POCT glycosylated hemoglobin (Hb A1C)    Thyroid nodule    Asymptomatic stenosis of left vertebral artery seen on cta 22    Other orders  -     Semaglutide (RYBELSUS) 14 MG TABS; Take one pill daily     Will try rybelsus  14 mg per pt request  He understands not to get dehydrated   He drinks a lot of fluid  He worked in dialysis and understands  He needs to dw cardiology his stenosis of the vertebral artery  Needs thyroid ultrasound  Follow up 3 m         Subjective   SUBJECTIVE/OBJECTIVE:  Diabetes    Had something last week with significant cough, no body aches. No fever  Sinus congestion  3 covid test were negative. Hga1c 6.5    He wants hga1c below 6  Losing wt on rybelsus  On days has to push food   He wants more rybelsus    Metformin gave him stomach cramps. No nausea , vomiting or cramps    At home  bp  116/75 -uses an auto . Tolerating crestor well    On trazodone  for sleep  every night     Forgot to get the thyroid ultrasound     22  Impression   1. Moderate to severe stenosis of dominant left vertebral artery at C5-C6. 2. No large vessel intracranial occlusion or high grade stenosis. 3. No significant stenoses of the internal carotid arteries. 4. 2.4 cm heterogeneous right thyroid nodule would be better evaluated by   outpatient ultrasound. 5. Other findings as described.                Lab Results   Component Value Date    LABA1C 6.3 08/10/2022    LABA1C 7.3 2022    LABA1C 6.1 2021    LABMICR <1.20 08/10/2022    LABMICR YES 2016    LABMICR YES 09/10/2015       Lab Results   Component Value Date    NA 143 08/10/2022     04/14/2022     04/13/2022    K 4.7 08/10/2022    K 4.1 04/14/2022    K 3.7 04/13/2022     08/10/2022     04/14/2022     04/13/2022    CO2 25 08/10/2022    CO2 20 (L) 04/14/2022    CO2 24 04/13/2022    BUN 14 08/10/2022    BUN 16 04/14/2022    BUN 20 04/13/2022    CREATININE 0.9 08/10/2022    CREATININE 0.9 04/14/2022    CREATININE 1.0 04/13/2022    GLUCOSE 122 (H) 08/10/2022    GLUCOSE 167 (H) 04/14/2022    GLUCOSE 261 (H) 04/13/2022    CALCIUM 9.4 08/10/2022    CALCIUM 8.9 04/14/2022    CALCIUM 9.3 04/13/2022       Lab Results   Component Value Date    CHOL 153 08/10/2022    CHOL 204 (H) 04/14/2022    CHOL 228 (H) 10/07/2021    TRIG 117 08/10/2022    TRIG 134 04/14/2022    TRIG 137 10/07/2021    HDL 44 08/10/2022    HDL 42 04/14/2022    HDL 57 10/07/2021    LDLCALC 86 08/10/2022    LDLCALC 135 (H) 04/14/2022    LDLCALC 144 (H) 10/07/2021       Lab Results   Component Value Date    ALT 20 08/10/2022    ALT 23 09/21/2021    ALT 14 09/10/2015    AST 19 08/10/2022    AST 20 09/21/2021    AST 13 (L) 09/10/2015       No results found for: TSH, T4FREE, T3FREE    Lab Results   Component Value Date    WBC 10.6 08/10/2022    WBC 11.0 04/14/2022    WBC 12.3 (H) 04/13/2022    HGB 13.5 08/10/2022    HGB 13.2 (L) 04/14/2022    HGB 13.9 04/13/2022    HCT 41.1 08/10/2022    HCT 39.5 (L) 04/14/2022    HCT 42.2 04/13/2022    MCV 89.5 08/10/2022    MCV 90.6 04/14/2022    MCV 90.5 04/13/2022     08/10/2022     04/14/2022     04/13/2022       No results found for: PSA     No results found for: LABURIC     Vitals:    11/28/22 1123   BP: 132/86   Pulse: 76   Resp: 16   SpO2: 95%       Review of Systems   Constitutional:  Positive for unexpected weight change (lost  15 lbs). Respiratory:  Negative for cough. Objective   Physical Exam  Constitutional:       Appearance: Normal appearance. HENT:      Head: Normocephalic and atraumatic.    Neck:      Comments: Thyroid nodule was not felt on exam  Cardiovascular:      Rate and Rhythm: Normal rate and regular rhythm. Pulses: Normal pulses. Heart sounds: Normal heart sounds. Neurological:      Mental Status: He is alert. Psychiatric:         Mood and Affect: Mood normal.         Behavior: Behavior normal.         Thought Content:  Thought content normal.         Judgment: Judgment normal.          Natalia Fatima MD

## 2022-12-12 RX ORDER — TRAZODONE HYDROCHLORIDE 50 MG/1
TABLET ORAL
Qty: 90 TABLET | Refills: 1 | Status: SHIPPED | OUTPATIENT
Start: 2022-12-12

## 2022-12-12 RX ORDER — FUROSEMIDE 20 MG/1
TABLET ORAL
Qty: 90 TABLET | Refills: 3 | Status: SHIPPED | OUTPATIENT
Start: 2022-12-12

## 2022-12-12 NOTE — TELEPHONE ENCOUNTER
Last OV:2022  Last Labs:8/10/2022  Last Refills:10/31/2022  Next Appt:  Return in about 3 months (around 2023).   Last EK2022

## 2023-01-03 RX ORDER — ORAL SEMAGLUTIDE 14 MG/1
TABLET ORAL
Qty: 30 TABLET | Refills: 0 | Status: SHIPPED | OUTPATIENT
Start: 2023-01-03

## 2023-01-13 RX ORDER — FLECAINIDE ACETATE 50 MG/1
50 TABLET ORAL 2 TIMES DAILY
Qty: 90 TABLET | Refills: 7 | Status: SHIPPED | OUTPATIENT
Start: 2023-01-13

## 2023-01-13 NOTE — TELEPHONE ENCOUNTER
Last OV: 11/16/2022  Next OV: x  Most recent Labs: cmp/ tsh 08/10/2022  Last EKG (if needed): 11/16/2022

## 2023-01-13 NOTE — TELEPHONE ENCOUNTER
Medication Refill    Medication needing refilled:  flecainide (TAMBOCOR    Dosage of the medication:  50 MG tablet     How are you taking this medication (QD, BID, TID, QID, PRN):  Take 1 tablet by mouth 2 times daily    30 or 90 day supply called in:  90 day    Which Pharmacy are we sending the medication to?:    [facility], [facility], [address] [place] [address], [address] [place] [address], [address] [place] [address]   Phone:  [phone]  Fax:  [phone]

## 2023-01-23 ENCOUNTER — TELEPHONE (OUTPATIENT)
Dept: FAMILY MEDICINE CLINIC | Age: 58
End: 2023-01-23

## 2023-01-23 DIAGNOSIS — D50.9 IRON DEFICIENCY ANEMIA, UNSPECIFIED IRON DEFICIENCY ANEMIA TYPE: ICD-10-CM

## 2023-01-23 DIAGNOSIS — E11.9 TYPE 2 DIABETES MELLITUS WITHOUT COMPLICATION, WITHOUT LONG-TERM CURRENT USE OF INSULIN (HCC): Primary | ICD-10-CM

## 2023-01-23 NOTE — TELEPHONE ENCOUNTER
----- Message from Elba Hill sent at 1/23/2023  9:22 AM EST -----  Subject: Referral Request    Reason for referral request? Needs Labs done for appointment on 1/31/31,   please call to schedule labs this week cholesterol, HDL, total LDL,   triglycerides and glucose will be needed   Provider patient wants to be referred to(if known):     Provider Phone Number(if known):     Additional Information for Provider?   ---------------------------------------------------------------------------  --------------  [address]    [phone]; OK to leave message on voicemail  ---------------------------------------------------------------------------  --------------

## 2023-01-23 NOTE — TELEPHONE ENCOUNTER
Pls call  Will order cmp  He had lipids done in aug and does not need them now unless something changed  We can get another hga1c after  3 months        Lab Results   Component Value Date/Time    LABA1C 6.5 11/28/2022 11:55 AM    LABA1C 6.3 08/10/2022 08:20 AM    LABA1C 7.3 04/14/2022 05:11 AM

## 2023-01-26 DIAGNOSIS — D50.9 IRON DEFICIENCY ANEMIA, UNSPECIFIED IRON DEFICIENCY ANEMIA TYPE: ICD-10-CM

## 2023-01-26 DIAGNOSIS — E11.9 TYPE 2 DIABETES MELLITUS WITHOUT COMPLICATION, WITHOUT LONG-TERM CURRENT USE OF INSULIN (HCC): ICD-10-CM

## 2023-01-26 LAB
A/G RATIO: 1.4 (ref 1.1–2.2)
ALBUMIN SERPL-MCNC: 4.6 G/DL (ref 3.4–5)
ALP BLD-CCNC: 155 U/L (ref 40–129)
ALT SERPL-CCNC: 24 U/L (ref 10–40)
ANION GAP SERPL CALCULATED.3IONS-SCNC: 17 MMOL/L (ref 3–16)
AST SERPL-CCNC: 23 U/L (ref 15–37)
BILIRUB SERPL-MCNC: 0.4 MG/DL (ref 0–1)
BUN BLDV-MCNC: 12 MG/DL (ref 7–20)
CALCIUM SERPL-MCNC: 9.7 MG/DL (ref 8.3–10.6)
CHLORIDE BLD-SCNC: 99 MMOL/L (ref 99–110)
CO2: 25 MMOL/L (ref 21–32)
CREAT SERPL-MCNC: 1.1 MG/DL (ref 0.9–1.3)
FERRITIN: 116.6 NG/ML (ref 30–400)
GFR SERPL CREATININE-BSD FRML MDRD: >60 ML/MIN/{1.73_M2}
GLUCOSE BLD-MCNC: 113 MG/DL (ref 70–99)
HCT VFR BLD CALC: 45.2 % (ref 40.5–52.5)
HEMOGLOBIN: 14.4 G/DL (ref 13.5–17.5)
IRON SATURATION: 16 % (ref 20–50)
IRON: 59 UG/DL (ref 59–158)
POTASSIUM SERPL-SCNC: 4.3 MMOL/L (ref 3.5–5.1)
SODIUM BLD-SCNC: 141 MMOL/L (ref 136–145)
TOTAL IRON BINDING CAPACITY: 372 UG/DL (ref 260–445)
TOTAL PROTEIN: 7.9 G/DL (ref 6.4–8.2)

## 2023-01-31 ENCOUNTER — OFFICE VISIT (OUTPATIENT)
Dept: FAMILY MEDICINE CLINIC | Age: 58
End: 2023-01-31
Payer: COMMERCIAL

## 2023-01-31 VITALS
WEIGHT: 295 LBS | HEIGHT: 74 IN | SYSTOLIC BLOOD PRESSURE: 116 MMHG | DIASTOLIC BLOOD PRESSURE: 62 MMHG | RESPIRATION RATE: 16 BRPM | OXYGEN SATURATION: 97 % | HEART RATE: 97 BPM | BODY MASS INDEX: 37.86 KG/M2

## 2023-01-31 DIAGNOSIS — Z00.00 LABORATORY TESTS ORDERED AS PART OF A COMPLETE PHYSICAL EXAM (CPE): ICD-10-CM

## 2023-01-31 DIAGNOSIS — Z80.42 FAMILY HISTORY OF PROSTATE CANCER: ICD-10-CM

## 2023-01-31 DIAGNOSIS — R00.0 TACHYCARDIA: ICD-10-CM

## 2023-01-31 DIAGNOSIS — Z00.00 PE (PHYSICAL EXAM), ANNUAL: Primary | ICD-10-CM

## 2023-01-31 DIAGNOSIS — Z86.2 HISTORY OF LEUKOCYTOSIS: ICD-10-CM

## 2023-01-31 DIAGNOSIS — Z86.2 HISTORY OF IRON DEFICIENCY ANEMIA: ICD-10-CM

## 2023-01-31 DIAGNOSIS — E11.9 TYPE 2 DIABETES MELLITUS WITHOUT COMPLICATION, WITHOUT LONG-TERM CURRENT USE OF INSULIN (HCC): ICD-10-CM

## 2023-01-31 PROCEDURE — G8482 FLU IMMUNIZE ORDER/ADMIN: HCPCS | Performed by: INTERNAL MEDICINE

## 2023-01-31 PROCEDURE — 99396 PREV VISIT EST AGE 40-64: CPT | Performed by: INTERNAL MEDICINE

## 2023-01-31 RX ORDER — OMEPRAZOLE 40 MG/1
40 CAPSULE, DELAYED RELEASE ORAL 2 TIMES DAILY
Status: CANCELLED | OUTPATIENT
Start: 2023-01-31

## 2023-01-31 RX ORDER — ORAL SEMAGLUTIDE 14 MG/1
TABLET ORAL
Qty: 90 TABLET | Refills: 1 | Status: SHIPPED | OUTPATIENT
Start: 2023-01-31

## 2023-01-31 ASSESSMENT — ENCOUNTER SYMPTOMS
NAUSEA: 0
WHEEZING: 0
DIARRHEA: 0
CONSTIPATION: 0
VOMITING: 0
COLOR CHANGE: 0
EYE PAIN: 0
SHORTNESS OF BREATH: 0

## 2023-01-31 ASSESSMENT — PATIENT HEALTH QUESTIONNAIRE - PHQ9
SUM OF ALL RESPONSES TO PHQ9 QUESTIONS 1 & 2: 0
SUM OF ALL RESPONSES TO PHQ QUESTIONS 1-9: 0
SUM OF ALL RESPONSES TO PHQ QUESTIONS 1-9: 0
2. FEELING DOWN, DEPRESSED OR HOPELESS: 0
1. LITTLE INTEREST OR PLEASURE IN DOING THINGS: 0
SUM OF ALL RESPONSES TO PHQ QUESTIONS 1-9: 0
SUM OF ALL RESPONSES TO PHQ QUESTIONS 1-9: 0

## 2023-03-31 RX ORDER — OMEPRAZOLE 40 MG/1
40 CAPSULE, DELAYED RELEASE ORAL 2 TIMES DAILY
Qty: 90 CAPSULE | Refills: 0 | Status: SHIPPED | OUTPATIENT
Start: 2023-03-31 | End: 2023-03-31 | Stop reason: SDUPTHER

## 2023-03-31 RX ORDER — OMEPRAZOLE 40 MG/1
40 CAPSULE, DELAYED RELEASE ORAL 2 TIMES DAILY
Qty: 180 CAPSULE | Refills: 0 | Status: SHIPPED | OUTPATIENT
Start: 2023-03-31

## 2023-06-24 ENCOUNTER — TELEPHONE (OUTPATIENT)
Dept: FAMILY MEDICINE CLINIC | Age: 58
End: 2023-06-24

## 2023-06-28 RX ORDER — TRAZODONE HYDROCHLORIDE 50 MG/1
TABLET ORAL
Qty: 90 TABLET | Refills: 0 | Status: SHIPPED | OUTPATIENT
Start: 2023-06-28

## 2023-07-27 ENCOUNTER — OFFICE VISIT (OUTPATIENT)
Dept: FAMILY MEDICINE CLINIC | Age: 58
End: 2023-07-27
Payer: COMMERCIAL

## 2023-07-27 VITALS
WEIGHT: 289 LBS | HEART RATE: 98 BPM | OXYGEN SATURATION: 96 % | HEIGHT: 74 IN | BODY MASS INDEX: 37.09 KG/M2 | SYSTOLIC BLOOD PRESSURE: 128 MMHG | DIASTOLIC BLOOD PRESSURE: 72 MMHG | RESPIRATION RATE: 16 BRPM

## 2023-07-27 DIAGNOSIS — E78.5 HYPERLIPIDEMIA, UNSPECIFIED HYPERLIPIDEMIA TYPE: ICD-10-CM

## 2023-07-27 DIAGNOSIS — D64.9 ANEMIA, UNSPECIFIED TYPE: ICD-10-CM

## 2023-07-27 DIAGNOSIS — E11.9 TYPE 2 DIABETES MELLITUS WITHOUT COMPLICATION, WITHOUT LONG-TERM CURRENT USE OF INSULIN (HCC): Primary | ICD-10-CM

## 2023-07-27 DIAGNOSIS — E11.9 TYPE 2 DIABETES MELLITUS WITHOUT COMPLICATION, WITHOUT LONG-TERM CURRENT USE OF INSULIN (HCC): ICD-10-CM

## 2023-07-27 LAB — HBA1C MFR BLD: 6.4 %

## 2023-07-27 PROCEDURE — 3044F HG A1C LEVEL LT 7.0%: CPT | Performed by: INTERNAL MEDICINE

## 2023-07-27 PROCEDURE — 83037 HB GLYCOSYLATED A1C HOME DEV: CPT | Performed by: INTERNAL MEDICINE

## 2023-07-27 PROCEDURE — 99214 OFFICE O/P EST MOD 30 MIN: CPT | Performed by: INTERNAL MEDICINE

## 2023-07-27 PROCEDURE — G8417 CALC BMI ABV UP PARAM F/U: HCPCS | Performed by: INTERNAL MEDICINE

## 2023-07-27 PROCEDURE — 1036F TOBACCO NON-USER: CPT | Performed by: INTERNAL MEDICINE

## 2023-07-27 PROCEDURE — 3017F COLORECTAL CA SCREEN DOC REV: CPT | Performed by: INTERNAL MEDICINE

## 2023-07-27 PROCEDURE — 2022F DILAT RTA XM EVC RTNOPTHY: CPT | Performed by: INTERNAL MEDICINE

## 2023-07-27 PROCEDURE — G8427 DOCREV CUR MEDS BY ELIG CLIN: HCPCS | Performed by: INTERNAL MEDICINE

## 2023-07-27 RX ORDER — ORAL SEMAGLUTIDE 14 MG/1
TABLET ORAL
Qty: 90 TABLET | Refills: 0 | Status: SHIPPED | OUTPATIENT
Start: 2023-07-27

## 2023-07-27 RX ORDER — ORAL SEMAGLUTIDE 14 MG/1
TABLET ORAL
Qty: 90 TABLET | Refills: 1 | OUTPATIENT
Start: 2023-07-27

## 2023-07-27 RX ORDER — TRAZODONE HYDROCHLORIDE 50 MG/1
TABLET ORAL
Qty: 90 TABLET | Refills: 0 | Status: CANCELLED | OUTPATIENT
Start: 2023-07-27

## 2023-07-27 RX ORDER — ROSUVASTATIN CALCIUM 5 MG/1
5 TABLET, COATED ORAL NIGHTLY
Qty: 90 TABLET | Refills: 3 | Status: CANCELLED | OUTPATIENT
Start: 2023-07-27

## 2023-07-27 NOTE — PROGRESS NOTES
Elidia Ibarra Barrier (:  1965) is a 66 y.o. male, here for evaluation of the following chief complaint(s):      Dex Vargas was seen today for diabetes. Diagnoses and all orders for this visit:    Type 2 diabetes mellitus without complication, without long-term current use of insulin ([address])- dx , rybelsus  -     POCT glycosylated hemoglobin (Hb A1C)  -     Semaglutide (RYBELSUS) 14 MG TABS; TAKE ONE TABLET BY MOUTH ONE TIME A DAY  -     Comprehensive Metabolic Panel; Future  -     Microalbumin / Creatinine Urine Ratio; Future    Anemia, unspecified type  -     Iron and TIBC; Future  -     CBC with Auto Differential; Future  -     Ferritin; Future    Hyperlipidemia, unspecified hyperlipidemia type        Follow up 3 month   Saw hematology ? More than a year ago  Not clear why he has anemia  Had upper and lower scope   Good control of dm   continue rybelsus  On crestor for high cholestero  Due for lipids now  Old order in computer  Subjective   SUBJECTIVE/OBJECTIVE:  Diabetes      DM  Lost  20 lbs. Says he is an emotional eater  Put down dog ,  another dog had cancer  Rybelsus  Helped with wt loss but evened out with wt   On highest dose of rybelsus  [facility] .     Anemia  Was told to check with gi and hematology for his anemia    On crestor  No myalgias  Hemoglobin A1C (%)   Date Value   2023 6.4   2022 6.5   08/10/2022 6.3       Sodium (mmol/L)   Date Value   2023 141   08/10/2022 143   2022 138     Potassium (mmol/L)   Date Value   2023 4.3   08/10/2022 4.7   2021 4.8     Potassium reflex Magnesium (mmol/L)   Date Value   2022 4.1   2022 3.7     Chloride (mmol/L)   Date Value   2023 99   08/10/2022 106   2022 105     CO2 (mmol/L)   Date Value   2023 25   08/10/2022 25   2022 20 (L)     BUN (mg/dL)   Date Value   2023 12   08/10/2022 14   2022 16     Creatinine (mg/dL)   Date Value   2023 1.1

## 2023-07-28 ENCOUNTER — TELEPHONE (OUTPATIENT)
Dept: FAMILY MEDICINE CLINIC | Age: 58
End: 2023-07-28

## 2023-07-28 NOTE — TELEPHONE ENCOUNTER
Semaglutide (RYBELSUS) 14 MG TABS [[id number]]     Order Details  Dose, Route, Frequency: As Directed   Dispense Quantity: 90 tablet Refills: 0          Sig: TAKE ONE TABLET BY MOUTH ONE TIME A DAY         Start Date: 07/27/23 End Date: --   Written Date: 07/27/23 Expiration Date: 07/26/24       Diagnosis Association: Type 2 diabetes mellitus without complication, without long-term current use of insulin ([address])- dx 2022, rybelsus   Original Order:  Semaglutide (Brad Sides) 14 MG TABS [[id number]]   Providers    Authorizing Provider: Modesto Horan MD NPI: [id number]   Ordering User:  Modesto Horan, [address] [address]   Phone:  [phone]  Fax:  [phone]

## 2023-07-29 DIAGNOSIS — D64.9 ANEMIA, UNSPECIFIED TYPE: ICD-10-CM

## 2023-07-29 DIAGNOSIS — E11.9 TYPE 2 DIABETES MELLITUS WITHOUT COMPLICATION, WITHOUT LONG-TERM CURRENT USE OF INSULIN (HCC): ICD-10-CM

## 2023-07-29 LAB
ALBUMIN SERPL-MCNC: 4.1 G/DL (ref 3.4–5)
ALBUMIN/GLOB SERPL: 1.4 {RATIO} (ref 1.1–2.2)
ALP SERPL-CCNC: 134 U/L (ref 40–129)
ALT SERPL-CCNC: 15 U/L (ref 10–40)
ANION GAP SERPL CALCULATED.3IONS-SCNC: 13 MMOL/L (ref 3–16)
AST SERPL-CCNC: 13 U/L (ref 15–37)
BASOPHILS # BLD: 0.1 K/UL (ref 0–0.2)
BASOPHILS NFR BLD: 1 %
BILIRUB SERPL-MCNC: 0.4 MG/DL (ref 0–1)
BUN SERPL-MCNC: 11 MG/DL (ref 7–20)
CALCIUM SERPL-MCNC: 9.2 MG/DL (ref 8.3–10.6)
CHLORIDE SERPL-SCNC: 102 MMOL/L (ref 99–110)
CO2 SERPL-SCNC: 24 MMOL/L (ref 21–32)
CREAT SERPL-MCNC: 0.9 MG/DL (ref 0.9–1.3)
DEPRECATED RDW RBC AUTO: 15.2 % (ref 12.4–15.4)
EOSINOPHIL # BLD: 0.3 K/UL (ref 0–0.6)
EOSINOPHIL NFR BLD: 2 %
FERRITIN SERPL IA-MCNC: 83.6 NG/ML (ref 30–400)
GFR SERPLBLD CREATININE-BSD FMLA CKD-EPI: >60 ML/MIN/{1.73_M2}
GLUCOSE SERPL-MCNC: 163 MG/DL (ref 70–99)
HCT VFR BLD AUTO: 42 % (ref 40.5–52.5)
HGB BLD-MCNC: 13.8 G/DL (ref 13.5–17.5)
IRON SATN MFR SERPL: 23 % (ref 20–50)
IRON SERPL-MCNC: 74 UG/DL (ref 59–158)
LYMPHOCYTES # BLD: 3.9 K/UL (ref 1–5.1)
LYMPHOCYTES NFR BLD: 30 %
MCH RBC QN AUTO: 28.7 PG (ref 26–34)
MCHC RBC AUTO-ENTMCNC: 32.9 G/DL (ref 31–36)
MCV RBC AUTO: 87.3 FL (ref 80–100)
MONOCYTES # BLD: 0.5 K/UL (ref 0–1.3)
MONOCYTES NFR BLD: 4 %
NEUTROPHILS # BLD: 8.2 K/UL (ref 1.7–7.7)
NEUTROPHILS NFR BLD: 63 %
PLATELET # BLD AUTO: 311 K/UL (ref 135–450)
PMV BLD AUTO: 8.3 FL (ref 5–10.5)
POTASSIUM SERPL-SCNC: 5.1 MMOL/L (ref 3.5–5.1)
PROT SERPL-MCNC: 7.1 G/DL (ref 6.4–8.2)
RBC # BLD AUTO: 4.81 M/UL (ref 4.2–5.9)
RBC MORPH BLD: NORMAL
SODIUM SERPL-SCNC: 139 MMOL/L (ref 136–145)
TIBC SERPL-MCNC: 316 UG/DL (ref 260–445)
WBC # BLD AUTO: 13 K/UL (ref 4–11)

## 2023-07-31 NOTE — TELEPHONE ENCOUNTER
Submitted PA for RYBELSUS  [facility] Key: [id number] STATUS: PENDING. Follow up done daily; if no response in three days we will refax for status check. If another three days goes by with no response we will call the insurance for status.

## 2023-08-01 DIAGNOSIS — R74.8 ELEVATED ALKALINE PHOSPHATASE LEVEL: Primary | ICD-10-CM

## 2023-08-01 DIAGNOSIS — D72.829 LEUKOCYTOSIS, UNSPECIFIED TYPE: ICD-10-CM

## 2023-08-03 NOTE — TELEPHONE ENCOUNTER
APPROVED. LETTER ATTACHED. If this requires a response please respond to the pool. [facility]). Please advise patient thank you.

## 2023-08-17 RX ORDER — TIZANIDINE 2 MG/1
TABLET ORAL
Qty: 20 TABLET | Refills: 0 | Status: SHIPPED | OUTPATIENT
Start: 2023-08-17

## 2023-08-17 RX ORDER — TIZANIDINE 2 MG/1
TABLET ORAL
Qty: 20 TABLET | Refills: 0 | Status: SHIPPED | OUTPATIENT
Start: 2023-08-17 | End: 2023-08-17 | Stop reason: SDUPTHER

## 2023-09-20 RX ORDER — FLECAINIDE ACETATE 50 MG/1
50 TABLET ORAL 2 TIMES DAILY
Qty: 180 TABLET | Refills: 3 | Status: SHIPPED | OUTPATIENT
Start: 2023-09-20

## 2023-09-26 ENCOUNTER — OFFICE VISIT (OUTPATIENT)
Dept: SLEEP MEDICINE | Age: 58
End: 2023-09-26
Payer: COMMERCIAL

## 2023-09-26 VITALS
DIASTOLIC BLOOD PRESSURE: 80 MMHG | TEMPERATURE: 98.3 F | RESPIRATION RATE: 18 BRPM | OXYGEN SATURATION: 96 % | HEART RATE: 91 BPM | BODY MASS INDEX: 37.32 KG/M2 | SYSTOLIC BLOOD PRESSURE: 124 MMHG | HEIGHT: 74 IN | WEIGHT: 290.8 LBS

## 2023-09-26 DIAGNOSIS — E66.01 CLASS 2 SEVERE OBESITY DUE TO EXCESS CALORIES WITH SERIOUS COMORBIDITY AND BODY MASS INDEX (BMI) OF 37.0 TO 37.9 IN ADULT (HCC): ICD-10-CM

## 2023-09-26 DIAGNOSIS — G47.33 OSA ON CPAP: Primary | ICD-10-CM

## 2023-09-26 DIAGNOSIS — Z99.89 DEPENDENCE ON OTHER ENABLING MACHINES AND DEVICES: ICD-10-CM

## 2023-09-26 PROCEDURE — 99214 OFFICE O/P EST MOD 30 MIN: CPT | Performed by: PSYCHIATRY & NEUROLOGY

## 2023-09-26 PROCEDURE — G8417 CALC BMI ABV UP PARAM F/U: HCPCS | Performed by: PSYCHIATRY & NEUROLOGY

## 2023-09-26 PROCEDURE — 1036F TOBACCO NON-USER: CPT | Performed by: PSYCHIATRY & NEUROLOGY

## 2023-09-26 PROCEDURE — 3017F COLORECTAL CA SCREEN DOC REV: CPT | Performed by: PSYCHIATRY & NEUROLOGY

## 2023-09-26 PROCEDURE — G8427 DOCREV CUR MEDS BY ELIG CLIN: HCPCS | Performed by: PSYCHIATRY & NEUROLOGY

## 2023-09-26 ASSESSMENT — SLEEP AND FATIGUE QUESTIONNAIRES
HOW LIKELY ARE YOU TO NOD OFF OR FALL ASLEEP WHILE LYING DOWN TO REST IN THE AFTERNOON WHEN CIRCUMSTANCES PERMIT: 0
HOW LIKELY ARE YOU TO NOD OFF OR FALL ASLEEP WHILE SITTING QUIETLY AFTER LUNCH WITHOUT ALCOHOL: 0
ESS TOTAL SCORE: 2
HOW LIKELY ARE YOU TO NOD OFF OR FALL ASLEEP WHILE SITTING AND READING: 0
HOW LIKELY ARE YOU TO NOD OFF OR FALL ASLEEP WHILE WATCHING TV: 1
HOW LIKELY ARE YOU TO NOD OFF OR FALL ASLEEP WHEN YOU ARE A PASSENGER IN A CAR FOR AN HOUR WITHOUT A BREAK: 0
HOW LIKELY ARE YOU TO NOD OFF OR FALL ASLEEP WHILE SITTING AND TALKING TO SOMEONE: 0
HOW LIKELY ARE YOU TO NOD OFF OR FALL ASLEEP IN A CAR, WHILE STOPPED FOR A FEW MINUTES IN TRAFFIC: 0
HOW LIKELY ARE YOU TO NOD OFF OR FALL ASLEEP WHILE SITTING INACTIVE IN A PUBLIC PLACE: 1

## 2023-09-26 NOTE — PROGRESS NOTES
MD LANDRY Alexander Board Certified in Sleep Medicine  Certified in [address] [place] [address] Certified in Neurology [address] [place] [address], [address] [place] [phone]   [address] [place] [address], [address], [facility]                      [address]  [address]  [address] [id number] [phone]    Subjective:     Patient ID: Serge Rowland is a 66 y.o. male. Chief Complaint   Patient presents with    Sleep Apnea       HPI:        Serge Rowland is a 66 y.o. male was seen today as annual follow for severe obstructive sleep apnea with an AHI - 87.4/hr with Low SaO2 - 77% and time below 90% of 53.2 min. Patient is using the PAP machine about 100% of the time, more than 4 hours a nightabout  100 %, in total average of 8.6 hours a night in last 90 days. Currently on PAP at 9.5 cm (8-16), the AHI is only 0.6 events per hour at this pressure. Patient improved regarding daytime sleepiness and fatigue, wakes up refreshed in the morning. The Patient scored [place] Sleepiness Score: 2 on [place] Sleepiness Scale ( more than 10 is indicative of daytime sleepiness)   Patient has no problem with PAP pressure or mask. nasal pillow mask. BP is stable. Has lost 10 pounds since last visit.    DOT/CDL - N/A        Previous Report(s)Reviewed: historical medical records         Social History     Socioeconomic History    Marital status:      Spouse name: Not on file    Number of children: Not on file    Years of education: Not on file    Highest education level: Not on file   Occupational History    Occupation: NA   Tobacco Use    Smoking status: Former     Packs/day: 0.25     Years: 30.00     Additional pack years: 0.00     Total pack years: 7.50     Types: Cigarettes     Start date: 3/11/1985     Quit date: 11/26/2015

## 2023-09-28 RX ORDER — OMEPRAZOLE 40 MG/1
40 CAPSULE, DELAYED RELEASE ORAL 2 TIMES DAILY
Qty: 180 CAPSULE | Refills: 1 | Status: SHIPPED | OUTPATIENT
Start: 2023-09-28

## 2023-10-01 ENCOUNTER — TELEPHONE (OUTPATIENT)
Dept: FAMILY MEDICINE CLINIC | Age: 58
End: 2023-10-01

## 2023-10-02 RX ORDER — TRAZODONE HYDROCHLORIDE 50 MG/1
50 TABLET ORAL NIGHTLY
Qty: 90 TABLET | Refills: 1 | Status: SHIPPED | OUTPATIENT
Start: 2023-10-02

## 2023-10-04 NOTE — TELEPHONE ENCOUNTER
Med was sent yesterday to [facility]. Please let the patient know       traZODone (DESYREL) 50 MG tablet [[id number]]     Order Details  Dose: 50 mg Route: Oral Frequency: NIGHTLY   Dispense Quantity: 90 tablet Refills: 1          Sig:    Take 1 tablet by mouth nightly TAKE ONE TABLET BY MOUTH NIGHTLY AS NEEDED FOR SLEEP   Strength: 50 mg          Start Date: 10/02/23 End Date: --   Written Date: 10/02/23 Expiration Date: 10/01/24   Original Order:  traZODone (DESYREL) 50 MG tablet [[id number]]   Providers    Authorizing Provider: Jammie Boas, MD NPI: [id number]   Ordering User:  Jammie Boas, [address] [place] [address] [id number] [address] [phone] Tai Vazquez [phone]   [address] [place] [address]   Phone:  [phone]  Fax:  [phone]

## 2023-10-04 NOTE — TELEPHONE ENCOUNTER
Pt calling in, Stated that he spoke with [facility] and they still do not have medication. Looked and saw med pending.     Pt is now out of medication    Please advise

## 2023-11-05 DIAGNOSIS — E11.9 TYPE 2 DIABETES MELLITUS WITHOUT COMPLICATION, WITHOUT LONG-TERM CURRENT USE OF INSULIN (HCC): ICD-10-CM

## 2023-11-06 RX ORDER — ROSUVASTATIN CALCIUM 5 MG/1
5 TABLET, COATED ORAL NIGHTLY
Qty: 90 TABLET | Refills: 0 | Status: SHIPPED | OUTPATIENT
Start: 2023-11-06

## 2023-11-07 RX ORDER — ORAL SEMAGLUTIDE 14 MG/1
TABLET ORAL
Qty: 90 TABLET | Refills: 0 | Status: SHIPPED | OUTPATIENT
Start: 2023-11-07

## 2023-12-04 ENCOUNTER — OFFICE VISIT (OUTPATIENT)
Dept: FAMILY MEDICINE CLINIC | Age: 58
End: 2023-12-04
Payer: COMMERCIAL

## 2023-12-04 VITALS
BODY MASS INDEX: 37.09 KG/M2 | OXYGEN SATURATION: 98 % | SYSTOLIC BLOOD PRESSURE: 130 MMHG | RESPIRATION RATE: 16 BRPM | DIASTOLIC BLOOD PRESSURE: 88 MMHG | WEIGHT: 289 LBS | HEART RATE: 64 BPM | HEIGHT: 74 IN

## 2023-12-04 DIAGNOSIS — Z86.39 HISTORY OF IRON DEFICIENCY: ICD-10-CM

## 2023-12-04 DIAGNOSIS — Z12.83 SCREENING EXAM FOR SKIN CANCER: ICD-10-CM

## 2023-12-04 DIAGNOSIS — E11.9 TYPE 2 DIABETES MELLITUS WITHOUT COMPLICATION, WITHOUT LONG-TERM CURRENT USE OF INSULIN (HCC): Primary | ICD-10-CM

## 2023-12-04 DIAGNOSIS — D72.829 LEUKOCYTOSIS, UNSPECIFIED TYPE: ICD-10-CM

## 2023-12-04 DIAGNOSIS — Z23 NEED FOR INFLUENZA VACCINATION: ICD-10-CM

## 2023-12-04 DIAGNOSIS — E04.1 THYROID NODULE: ICD-10-CM

## 2023-12-04 LAB — HBA1C MFR BLD: 6.5 %

## 2023-12-04 PROCEDURE — G8417 CALC BMI ABV UP PARAM F/U: HCPCS | Performed by: INTERNAL MEDICINE

## 2023-12-04 PROCEDURE — 3017F COLORECTAL CA SCREEN DOC REV: CPT | Performed by: INTERNAL MEDICINE

## 2023-12-04 PROCEDURE — 90674 CCIIV4 VAC NO PRSV 0.5 ML IM: CPT | Performed by: INTERNAL MEDICINE

## 2023-12-04 PROCEDURE — 3044F HG A1C LEVEL LT 7.0%: CPT | Performed by: INTERNAL MEDICINE

## 2023-12-04 PROCEDURE — 2022F DILAT RTA XM EVC RTNOPTHY: CPT | Performed by: INTERNAL MEDICINE

## 2023-12-04 PROCEDURE — 83036 HEMOGLOBIN GLYCOSYLATED A1C: CPT | Performed by: INTERNAL MEDICINE

## 2023-12-04 PROCEDURE — G8482 FLU IMMUNIZE ORDER/ADMIN: HCPCS | Performed by: INTERNAL MEDICINE

## 2023-12-04 PROCEDURE — 99214 OFFICE O/P EST MOD 30 MIN: CPT | Performed by: INTERNAL MEDICINE

## 2023-12-04 PROCEDURE — 1036F TOBACCO NON-USER: CPT | Performed by: INTERNAL MEDICINE

## 2023-12-04 PROCEDURE — G8427 DOCREV CUR MEDS BY ELIG CLIN: HCPCS | Performed by: INTERNAL MEDICINE

## 2023-12-04 PROCEDURE — 90471 IMMUNIZATION ADMIN: CPT | Performed by: INTERNAL MEDICINE

## 2023-12-04 NOTE — PROGRESS NOTES
Value   07/29/2023 102   01/26/2023 99   08/10/2022 106     CO2 (mmol/L)   Date Value   07/29/2023 24   01/26/2023 25   08/10/2022 25     BUN (mg/dL)   Date Value   07/29/2023 11   01/26/2023 12   08/10/2022 14     Creatinine (mg/dL)   Date Value   07/29/2023 0.9   01/26/2023 1.1   08/10/2022 0.9     Glucose (mg/dL)   Date Value   07/29/2023 163 (H)   01/26/2023 113 (H)   08/10/2022 122 (H)     Calcium (mg/dL)   Date Value   07/29/2023 9.2   01/26/2023 9.7   08/10/2022 9.4       Cholesterol, Total (mg/dL)   Date Value   08/10/2022 153   04/14/2022 204 (H)   10/07/2021 228 (H)     Triglycerides (mg/dL)   Date Value   08/10/2022 117   04/14/2022 134   10/07/2021 137     HDL (mg/dL)   Date Value   08/10/2022 44   04/14/2022 42   10/07/2021 57     LDL Calculated (mg/dL)   Date Value   08/10/2022 86   04/14/2022 135 (H)   10/07/2021 144 (H)       ALT (U/L)   Date Value   07/29/2023 15   01/26/2023 24   08/10/2022 20     AST (U/L)   Date Value   07/29/2023 13 (L)   01/26/2023 23   08/10/2022 19       No results found for: \"TSH\", \"T4FREE\", \"T3FREE\"    WBC (K/uL)   Date Value   07/29/2023 13.0 (H)   08/10/2022 10.6   04/14/2022 11.0     Hemoglobin (g/dL)   Date Value   07/29/2023 13.8   01/26/2023 14.4   08/10/2022 13.5     Hematocrit (%)   Date Value   07/29/2023 42.0   01/26/2023 45.2   08/10/2022 41.1     MCV (fL)   Date Value   07/29/2023 87.3   08/10/2022 89.5   04/14/2022 90.6     Platelets (K/uL)   Date Value   07/29/2023 311   08/10/2022 306   04/14/2022 260       No results found for: \"PSA\"     No results found for: \"LABURIC\"     Vitals:    12/04/23 1603   BP: 130/88   Pulse: 64   Resp: 16   SpO2: 98%       BP Readings from Last 3 Encounters:   12/04/23 130/88   09/26/23 124/80   07/27/23 128/72        Wt Readings from Last 3 Encounters:   12/04/23 131.1 kg (289 lb)   09/26/23 131.9 kg (290 lb 12.8 oz)   07/27/23 131.1 kg (289 lb)        Review of Systems       Objective   Physical Exam  Constitutional:

## 2023-12-26 RX ORDER — FUROSEMIDE 20 MG/1
20 TABLET ORAL DAILY
Qty: 90 TABLET | Refills: 3 | Status: SHIPPED | OUTPATIENT
Start: 2023-12-26

## 2024-01-02 RX ORDER — TRAZODONE HYDROCHLORIDE 50 MG/1
TABLET ORAL
Qty: 90 TABLET | Refills: 2 | Status: SHIPPED | OUTPATIENT
Start: 2024-01-02

## 2024-01-04 ENCOUNTER — OFFICE VISIT (OUTPATIENT)
Dept: ORTHOPEDIC SURGERY | Age: 59
End: 2024-01-04
Payer: COMMERCIAL

## 2024-01-04 VITALS — WEIGHT: 294.8 LBS | BODY MASS INDEX: 37.83 KG/M2 | RESPIRATION RATE: 17 BRPM | HEIGHT: 74 IN

## 2024-01-04 DIAGNOSIS — M51.37 DDD (DEGENERATIVE DISC DISEASE), LUMBOSACRAL: Primary | ICD-10-CM

## 2024-01-04 PROBLEM — M54.50 LEFT-SIDED LOW BACK PAIN WITHOUT SCIATICA: Status: ACTIVE | Noted: 2024-01-04

## 2024-01-04 PROCEDURE — G8417 CALC BMI ABV UP PARAM F/U: HCPCS | Performed by: PHYSICIAN ASSISTANT

## 2024-01-04 PROCEDURE — 1036F TOBACCO NON-USER: CPT | Performed by: PHYSICIAN ASSISTANT

## 2024-01-04 PROCEDURE — G8427 DOCREV CUR MEDS BY ELIG CLIN: HCPCS | Performed by: PHYSICIAN ASSISTANT

## 2024-01-04 PROCEDURE — 3017F COLORECTAL CA SCREEN DOC REV: CPT | Performed by: PHYSICIAN ASSISTANT

## 2024-01-04 PROCEDURE — 99203 OFFICE O/P NEW LOW 30 MIN: CPT | Performed by: PHYSICIAN ASSISTANT

## 2024-01-04 PROCEDURE — G8482 FLU IMMUNIZE ORDER/ADMIN: HCPCS | Performed by: PHYSICIAN ASSISTANT

## 2024-01-04 RX ORDER — MELOXICAM 15 MG/1
15 TABLET ORAL DAILY
Qty: 30 TABLET | Refills: 1 | Status: SHIPPED | OUTPATIENT
Start: 2024-01-04

## 2024-01-04 NOTE — PROGRESS NOTES
please bring any significant errors to my attention for an addendum.  All efforts were made to ensure that this office note is accurate.

## 2024-01-05 ENCOUNTER — TELEPHONE (OUTPATIENT)
Dept: ORTHOPEDIC SURGERY | Age: 59
End: 2024-01-05

## 2024-01-05 NOTE — TELEPHONE ENCOUNTER
OTHER    PATIENT'S SPOUSE CALLED TO INFORM CLINICAL-FAX WILL BE DIRECTED TO HER PRIVATE WORK FAX.    PLEASE ADVISE

## 2024-01-05 NOTE — TELEPHONE ENCOUNTER
General Question     Subject: WORK EXCUSE  Patient and /or Facility Request: Rani Penaloza  Contact Number: [phone]    WIFE CLLED REQ WORK EXCUSE FOR BRING PT TO VISIT ON 1/4/24  PLEASE FAX# [phone]

## 2025-03-31 NOTE — PROGRESS NOTES
Name of Medication(s) Requested:  Requested Prescriptions     Pending Prescriptions Disp Refills    minocycline (MINOCIN;DYNACIN) 100 MG capsule [Pharmacy Med Name: Minocycline HCl Oral Capsule 100 MG] 180 capsule 1     Sig: TAKE ONE CAPSULE BY MOUTH TWO TIMES DAILY       Medication is on current medication list Yes    Dosage and directions were verified? Yes    Quantity verified: 90 day supply     Pharmacy Verified?  Yes    Last Appointment:  3/21/2025    Future appts:  Future Appointments   Date Time Provider Department Center   5/9/2025 11:00 AM Marlene Mccollum DO [place] [facility]        (If no appt send self scheduling link. .REFILLAPPT)  Scheduling request sent?     [] Yes  [x] No    Does patient need updated?  [] Yes  [x] No   Well Adult Note  Name: Love Gastelum Date: 2023   MRN: [id number] Sex: Male   Age: 56 y.o. Ethnicity:  / Gearl Devaughn   : 1965 Race:  / Lalo Pressley is here for well adult exam.  History:  Here for cpe    Seen dr Sondra Pat and dr Josiane Leonard  For iron  def anemia  EGD/Colonoscopy   per pt ok  Started with dr Sondra Pat , numbers improved  Has not seen him in awhile     Quit taking iron several months ago    DM  Losing wt  On rybelsus  At home 200 from 80 with wt on rybelsus    Father had prostate cancer.   Little over 100 for glucose in the am  Only once a day  Lab Results   Component Value Date    LABA1C 6.5 2022    LABA1C 6.3 08/10/2022    LABA1C 7.3 2022    LABMICR <1.20 08/10/2022    LABMICR YES 2016    LABMICR YES 09/10/2015       Lab Results   Component Value Date     2023     08/10/2022     2022    K 4.3 2023    K 4.7 08/10/2022    K 4.1 2022    CL 99 2023     08/10/2022     2022    CO2 25 2023    CO2 25 08/10/2022    CO2 20 (L) 2022    BUN 12 2023    BUN 14 08/10/2022    BUN 16 2022    CREATININE 1.1 2023    CREATININE 0.9 08/10/2022    CREATININE 0.9 2022    GLUCOSE 113 (H) 2023    GLUCOSE 122 (H) 08/10/2022    GLUCOSE 167 (H) 2022    CALCIUM 9.7 2023    CALCIUM 9.4 08/10/2022    CALCIUM 8.9 2022       Lab Results   Component Value Date    CHOL 153 08/10/2022    CHOL 204 (H) 2022    CHOL 228 (H) 10/07/2021    TRIG 117 08/10/2022    TRIG 134 2022    TRIG 137 10/07/2021    HDL 44 08/10/2022    HDL 42 2022    HDL 57 10/07/2021    LDLCALC 86 08/10/2022    LDLCALC 135 (H) 2022    LDLCALC 144 (H) 10/07/2021       Lab Results   Component Value Date    ALT 24 2023    ALT 20 08/10/2022    ALT 23 2021    AST 23 2023    AST 19 08/10/2022    AST 20 2021       No results found for: TSH, T4FREE, T3FREE    Lab Results   Component Value Date    WBC 10.6 08/10/2022    WBC 11.0 04/14/2022    WBC 12.3 (H) 04/13/2022    HGB 14.4 01/26/2023    HGB 13.5 08/10/2022    HGB 13.2 (L) 04/14/2022    HCT 45.2 01/26/2023    HCT 41.1 08/10/2022    HCT 39.5 (L) 04/14/2022    MCV 89.5 08/10/2022    MCV 90.6 04/14/2022    MCV 90.5 04/13/2022     08/10/2022     04/14/2022     04/13/2022       No results found for: PSA     No results found for: LABURIC        Review of Systems   Constitutional:  Negative for fatigue and unexpected weight change. HENT:  Negative for hearing loss and tinnitus. Eyes:  Negative for pain and visual disturbance. Respiratory:  Negative for shortness of breath and wheezing. Cardiovascular:  Negative for chest pain, palpitations and leg swelling. Gastrointestinal:  Negative for constipation, diarrhea, nausea and vomiting. Endocrine: Negative for cold intolerance and heat intolerance. Genitourinary:  Negative for dysuria and frequency. Musculoskeletal:  Negative for gait problem and joint swelling. Skin:  Negative for color change and rash. Neurological:  Negative for dizziness and headaches. Psychiatric/Behavioral:  Negative for dysphoric mood. The patient is not nervous/anxious. No Known Allergies      Prior to Visit Medications    Medication Sig Taking?  Authorizing Provider   flecainide (TAMBOCOR) 50 MG tablet Take 1 tablet by mouth 2 times daily Yes Dimas James MD   Semaglutide (RYBELSUS) 14 MG TABS TAKE ONE TABLET BY MOUTH ONE TIME A DAY Yes Miladis Weaver MD   traZODone (DESYREL) 50 MG tablet TAKE ONE TABLET BY MOUTH NIGHTLY AS NEEDED FOR SLEEP Yes Belgica Romeo MD   furosemide (LASIX) 20 MG tablet TAKE 1 TABLET BY MOUTH ONCE DAILY Yes Gorge Knapp MD   rosuvastatin (CRESTOR) 5 MG tablet TAKE 1 TABLET BY MOUTH NIGHTLY Yes Belgica Romeo MD   metoprolol succinate (TOPROL XL) 25 MG extended release tablet Take 1 tablet by mouth daily Yes Josiah Pop MD   glucose monitoring (FREESTYLE FREEDOM) kit 1 kit by Does not apply route daily Yes Aniceto Meneses MD   blood glucose monitor strips Test   BID or as needed for high or low glucose Yes Aniceto Meneses MD   blood glucose monitor strips Test 2  times a day & as needed for symptoms Yes Aniceto Meneses MD   Lancets MISC 1 each by Does not apply route daily Test BID Yes Aniceto Meneses MD   ibuprofen (ADVIL;MOTRIN) 200 MG tablet Take 800 mg by mouth every 6 hours as needed Yes Historical Provider, MD   aspirin 81 MG EC tablet Take 81 mg by mouth daily Yes Historical Provider, MD   omeprazole (PRILOSEC) 40 MG delayed release capsule Take 40 mg by mouth 2 times daily  Yes Historical Provider, MD         Past Medical History:   Diagnosis Date    Anxiety     Anxiety 2005    Asthma 2019    exercise-induced asthma    Bursitis 2016    both shoulders    Depression     Depression 2019    Diverticula of colon     Diverticulitis 2019    of colon    GERD (gastroesophageal reflux disease)     Orthostatic hypotension 05/18/2021    Reflux esophagitis 2005    Sleep apnea     cpap       Past Surgical History:   Procedure Laterality Date    COLON SURGERY      COLONOSCOPY      COLONOSCOPY N/A 1/28/2022    COLONOSCOPY POLYPECTOMY SNARE/COLD BIOPSY performed by Farhana Carbajal MD at [address] [place] [address]  2014    closed    HIP SURGERY Bilateral L 2006, R 2007    Total Arthroplasty    JOINT REPLACEMENT  12/11/2019    hx bilateral hip replacements    LAPAROTOMY  7-17-14    EXPLORATORY LAPAROTOMY, BOWEL RESECTION, COLOSTOMY    LUNG SURGERY      OTHER SURGICAL HISTORY  7/24/14    OTHER SURGICAL HISTORY  10/5/15    Excision of sutures from abdominal wound    REVISION COLOSTOMY  11/19/14    Reversal    ROTATOR CUFF REPAIR  2019    THORACENTESIS      insert chest tube-age 25    UPPER GASTROINTESTINAL ENDOSCOPY N/A 1/28/2022    EGD BIOPSY performed by [facility]hana Carbajal MD at [facility]         Family History   Problem Relation Age of Onset    Other Father         CHF    Cancer Father         prostate    Asthma Other     Heart Disease Other     High Blood Pressure Other     Cancer Brother         leukemia    Heart Attack Brother        Social History     Tobacco Use    Smoking status: Former     Packs/day: 0.25     Years: 30.00     Pack years: 7.50     Types: Cigarettes     Start date: 3/11/1985     Quit date: 2015     Years since quittin.1    Smokeless tobacco: Never   Vaping Use    Vaping Use: Never used   Substance Use Topics    Alcohol use: No    Drug use: No       Objective   /62 (Site: Right Upper Arm, Position: Sitting, Cuff Size: Large Adult)   Pulse 97   Resp 16   Ht 6' 2\" (1.88 m)   Wt 295 lb (133.8 kg)   SpO2 97%   BMI 37.88 kg/m²   Wt Readings from Last 3 Encounters:   23 295 lb (133.8 kg)   22 298 lb (135.2 kg)   22 298 lb (135.2 kg)     There were no vitals filed for this visit. Physical Exam  Constitutional:       Appearance: Normal appearance. He is well-developed. HENT:      Head: Normocephalic and atraumatic. Right Ear: There is impacted cerumen. Left Ear: Tympanic membrane and ear canal normal.   Eyes:      General: No scleral icterus. Conjunctiva/sclera: Conjunctivae normal.   Neck:      Thyroid: No thyromegaly. Cardiovascular:      Rate and Rhythm: Normal rate and regular rhythm. Heart sounds: Normal heart sounds. No murmur heard. Pulmonary:      Effort: Pulmonary effort is normal. No respiratory distress. Breath sounds: Normal breath sounds. No wheezing. Abdominal:      General: There is no distension. Palpations: Abdomen is soft. Tenderness: There is no abdominal tenderness. Musculoskeletal:         General: No deformity. Cervical back: Neck supple. Skin:     General: Skin is warm and dry. Findings: No rash. Neurological:      Mental Status: He is alert. Motor: No abnormal muscle tone. Comments: Motor 5/5 upper and lower ext   Speech clear   Psychiatric:         Mood and Affect: Mood normal.         Behavior: Behavior normal.         Thought Content:  Thought content normal.         Judgment: Judgment normal.             Personalized Preventive Plan   Current Health Maintenance Status  Immunization History   Administered Date(s) Administered    COVID-19, PFIZER PURPLE top, DILUTE for use, (age 15 y+), 30mcg/0.3mL 03/18/2021, 04/08/2021, 02/10/2022    Influenza Whole 10/01/2014    Influenza, AFLURIA (age 1 yrs+), FLUZONE, (age 10 mo+), MDV, 0.5mL 10/06/2021    Influenza, FLUCELVAX, (age 10 mo+), MDCK, PF, 0.5mL 11/28/2022    Pneumococcal Polysaccharide ([id number]) 10/08/2020    Tdap (Boostrix, Adacel) 03/11/2015    Zoster Live (Zostavax) 05/03/2017    Zoster Recombinant (Shingrix) 10/02/2020        Health Maintenance   Topic Date Due    HIV screen  Never done    Hepatitis C screen  Never done    Hepatitis B vaccine (1 of 3 - Risk 3-dose series) Never done    Shingles vaccine (3 of 3) 11/27/2020    COVID-19 Vaccine (4 - Booster for Dong Peter series) 04/07/2022    Depression Screen  05/04/2023    Diabetic retinal exam  05/31/2023    Diabetic Alb to Cr ratio (uACR) test  08/10/2023    Lipids  08/10/2023    Diabetic foot exam  09/01/2023    A1C test (Diabetic or Prediabetic)  11/28/2023    GFR test (Diabetes, CKD 3-4, OR last GFR 15-59)  01/26/2024    DTaP/Tdap/Td vaccine (2 - Td or Tdap) 03/11/2025    Colorectal Cancer Screen  01/28/2032    Flu vaccine  Completed    Pneumococcal 0-64 years Vaccine  Completed    Hepatitis A vaccine  Aged Out    Hib vaccine  Aged Out    Meningococcal (ACWY) vaccine  Aged Out     Recommendations for [place] Due: see orders and patient instructions/AVS.    Yasmeen Coats was seen today for annual exam.    Diagnoses and all orders for this visit:    PE (physical exam), annual    History of iron deficiency anemia    Type 2 diabetes mellitus without complication, without long-term current use of insulin ([facility] [address]- dx 2022, rybelsus    Family history of prostate cancer  -     PSA Screening; Future    History of leukocytosis    Tachycardia    Laboratory tests ordered as part of a complete physical exam (CPE)  -     Lipid Panel; Future    Other orders  -     Semaglutide (RYBELSUS) 14 MG TABS; TAKE ONE TABLET BY MOUTH ONE TIME A DAY     Doing well  Follow up 2 m dm follow up   Can re ck  hga1c next visit.   He needs to check with hematology and GI about further monitoring and eval for the ho iron def

## (undated) DEVICE — FORCEPS BX 240CM 2.4MM L NDL RAD JAW 4 M00513334

## (undated) DEVICE — SNARE ENDOSCP L240CM LOOP W13MM DIA2.4MM SHT THROW SM OVL